# Patient Record
Sex: FEMALE | Race: OTHER | HISPANIC OR LATINO
[De-identification: names, ages, dates, MRNs, and addresses within clinical notes are randomized per-mention and may not be internally consistent; named-entity substitution may affect disease eponyms.]

---

## 2017-03-13 ENCOUNTER — APPOINTMENT (OUTPATIENT)
Dept: OBGYN | Facility: CLINIC | Age: 72
End: 2017-03-13

## 2018-04-23 ENCOUNTER — LABORATORY RESULT (OUTPATIENT)
Age: 73
End: 2018-04-23

## 2018-04-24 ENCOUNTER — APPOINTMENT (OUTPATIENT)
Dept: OBGYN | Facility: CLINIC | Age: 73
End: 2018-04-24
Payer: MEDICARE

## 2018-04-24 VITALS
HEIGHT: 59 IN | DIASTOLIC BLOOD PRESSURE: 80 MMHG | BODY MASS INDEX: 36.29 KG/M2 | SYSTOLIC BLOOD PRESSURE: 128 MMHG | WEIGHT: 180 LBS

## 2018-04-24 PROCEDURE — 99397 PER PM REEVAL EST PAT 65+ YR: CPT

## 2018-04-24 RX ORDER — DULOXETINE HYDROCHLORIDE 20 MG/1
20 CAPSULE, DELAYED RELEASE ORAL
Refills: 0 | Status: ACTIVE | COMMUNITY

## 2019-06-18 ENCOUNTER — EMERGENCY (EMERGENCY)
Facility: HOSPITAL | Age: 74
LOS: 1 days | Discharge: HOME | End: 2019-06-18
Attending: EMERGENCY MEDICINE
Payer: MEDICARE

## 2019-06-18 VITALS
WEIGHT: 179.9 LBS | TEMPERATURE: 98 F | RESPIRATION RATE: 17 BRPM | DIASTOLIC BLOOD PRESSURE: 86 MMHG | HEIGHT: 64 IN | OXYGEN SATURATION: 96 % | HEART RATE: 77 BPM | SYSTOLIC BLOOD PRESSURE: 141 MMHG

## 2019-06-18 VITALS
RESPIRATION RATE: 18 BRPM | SYSTOLIC BLOOD PRESSURE: 133 MMHG | DIASTOLIC BLOOD PRESSURE: 86 MMHG | TEMPERATURE: 98 F | OXYGEN SATURATION: 96 % | HEART RATE: 76 BPM

## 2019-06-18 DIAGNOSIS — Z98.89 OTHER SPECIFIED POSTPROCEDURAL STATES: Chronic | ICD-10-CM

## 2019-06-18 DIAGNOSIS — L73.2 HIDRADENITIS SUPPURATIVA: Chronic | ICD-10-CM

## 2019-06-18 DIAGNOSIS — Z96.60 PRESENCE OF UNSPECIFIED ORTHOPEDIC JOINT IMPLANT: Chronic | ICD-10-CM

## 2019-06-18 PROCEDURE — 73562 X-RAY EXAM OF KNEE 3: CPT

## 2019-06-18 PROCEDURE — 99284 EMERGENCY DEPT VISIT MOD MDM: CPT

## 2019-06-18 PROCEDURE — 73700 CT LOWER EXTREMITY W/O DYE: CPT

## 2019-06-18 PROCEDURE — 99284 EMERGENCY DEPT VISIT MOD MDM: CPT | Mod: 25

## 2019-06-18 PROCEDURE — 73562 X-RAY EXAM OF KNEE 3: CPT | Mod: 26,RT

## 2019-06-18 RX ORDER — OXYCODONE AND ACETAMINOPHEN 5; 325 MG/1; MG/1
1 TABLET ORAL ONCE
Refills: 0 | Status: DISCONTINUED | OUTPATIENT
Start: 2019-06-18 | End: 2019-06-18

## 2019-06-18 RX ADMIN — OXYCODONE AND ACETAMINOPHEN 1 TABLET(S): 5; 325 TABLET ORAL at 21:06

## 2019-06-18 RX ADMIN — OXYCODONE AND ACETAMINOPHEN 1 TABLET(S): 5; 325 TABLET ORAL at 20:31

## 2019-06-18 NOTE — ED ADULT NURSE NOTE - NSIMPLEMENTINTERV_GEN_ALL_ED
Implemented All Universal Safety Interventions:  [place] to call system. Call bell, personal items and telephone within reach. Instruct patient to call for assistance. Room bathroom lighting operational. Non-slip footwear when patient is off stretcher. Physically safe environment: no spills, clutter or unnecessary equipment. Stretcher in lowest position, wheels locked, appropriate side rails in place.

## 2019-06-18 NOTE — ED ADULT NURSE NOTE - OBJECTIVE STATEMENT
presented with c/o RT knee/ ankle pain S/P [facility] fall today also reports lower back pain , [id number] head trauma LOC

## 2019-06-18 NOTE — ED PROVIDER NOTE - PSH
Hidradenitis suppurativa  right axilla and groin  S/P arthroscopy of left knee    S/P breast biopsy, right    S/P carpal tunnel release  right  S/P hip replacement  right  S/P spinal surgery  lumbar

## 2019-06-18 NOTE — ED ADULT NURSE NOTE - CAS ELECT INFOMATION PROVIDED
[facility]  Lauren  [phone]   Sedrick passed away last night   2/14   DC instructions

## 2019-06-18 NOTE — ED ADULT TRIAGE NOTE - CHIEF COMPLAINT QUOTE
R knee pain s/p trip and fall in the basement ,denied hitting head/loc, h/o charmaine hip replacement

## 2019-06-18 NOTE — ED PROVIDER NOTE - PROGRESS NOTE DETAILS
Pt adamant to leave. Understands no results back. Limit eval showing patellar fracture with comminution. Pt refusing to stay for official results. Placed in brace, has been ambulating with crutches. r/b/a explained.

## 2019-06-19 PROCEDURE — 73700 CT LOWER EXTREMITY W/O DYE: CPT | Mod: 26,RT

## 2020-01-15 ENCOUNTER — LABORATORY RESULT (OUTPATIENT)
Age: 75
End: 2020-01-15

## 2020-01-16 ENCOUNTER — APPOINTMENT (OUTPATIENT)
Dept: OBGYN | Facility: CLINIC | Age: 75
End: 2020-01-16
Payer: MEDICARE

## 2020-01-16 VITALS — WEIGHT: 175 LBS | SYSTOLIC BLOOD PRESSURE: 126 MMHG | DIASTOLIC BLOOD PRESSURE: 78 MMHG | BODY MASS INDEX: 35.35 KG/M2

## 2020-01-16 DIAGNOSIS — Z00.00 ENCOUNTER FOR GENERAL ADULT MEDICAL EXAMINATION W/OUT ABNORMAL FINDINGS: ICD-10-CM

## 2020-01-16 PROCEDURE — 99397 PER PM REEVAL EST PAT 65+ YR: CPT

## 2020-01-16 NOTE — COUNSELING
[Nutrition] : nutrition [Exercise] : exercise [Breast Self Exam] : breast self exam [STD (testing, results, tx)] : STD (testing, results, tx) [Vitamins/Supplements] : vitamins/supplements

## 2020-01-16 NOTE — PHYSICAL EXAM
[Awake] : awake [Alert] : alert [Soft] : soft [Oriented x3] : oriented to person, place, and time [No Bleeding] : there was no active vaginal bleeding [Uterine Adnexae] : were not tender and not enlarged [Normal] : cervix [CTAB] : CTAB [RRR, No Murmurs] : RRR, no murmurs [LAD] : no lymphadenopathy [Acute Distress] : no acute distress [Mass] : no breast mass [Goiter] : no goiter [Thyroid Nodule] : no thyroid nodule [Axillary LAD] : no axillary lymphadenopathy [Nipple Discharge] : no nipple discharge [Tender] : non tender [Distended] : not distended [H/Smegaly] : no hepatosplenomegaly [Depressed Mood] : not depressed [Flat Affect] : affect not flat

## 2020-02-04 ENCOUNTER — APPOINTMENT (OUTPATIENT)
Dept: GASTROENTEROLOGY | Facility: CLINIC | Age: 75
End: 2020-02-04

## 2020-02-19 ENCOUNTER — APPOINTMENT (OUTPATIENT)
Dept: GASTROENTEROLOGY | Facility: CLINIC | Age: 75
End: 2020-02-19
Payer: MEDICARE

## 2020-02-19 VITALS
BODY MASS INDEX: 34.47 KG/M2 | WEIGHT: 171 LBS | DIASTOLIC BLOOD PRESSURE: 75 MMHG | OXYGEN SATURATION: 98 % | TEMPERATURE: 97.7 F | SYSTOLIC BLOOD PRESSURE: 110 MMHG | HEART RATE: 65 BPM | HEIGHT: 59 IN

## 2020-02-19 DIAGNOSIS — R07.89 OTHER CHEST PAIN: ICD-10-CM

## 2020-02-19 DIAGNOSIS — K62.5 HEMORRHAGE OF ANUS AND RECTUM: ICD-10-CM

## 2020-02-19 DIAGNOSIS — K21.9 GASTRO-ESOPHAGEAL REFLUX DISEASE W/OUT ESOPHAGITIS: ICD-10-CM

## 2020-02-19 DIAGNOSIS — Z87.19 PERSONAL HISTORY OF OTHER DISEASES OF THE DIGESTIVE SYSTEM: ICD-10-CM

## 2020-02-19 DIAGNOSIS — R10.13 EPIGASTRIC PAIN: ICD-10-CM

## 2020-02-19 DIAGNOSIS — Z86.010 PERSONAL HISTORY OF COLONIC POLYPS: ICD-10-CM

## 2020-02-19 PROCEDURE — 99203 OFFICE O/P NEW LOW 30 MIN: CPT

## 2020-02-19 RX ORDER — POLYETHYLENE GLYCOL 3350, SODIUM CHLORIDE, SODIUM BICARBONATE AND POTASSIUM CHLORIDE WITH LEMON FLAVOR 420; 11.2; 5.72; 1.48 G/4L; G/4L; G/4L; G/4L
420 POWDER, FOR SOLUTION ORAL
Qty: 1 | Refills: 0 | Status: ACTIVE | COMMUNITY
Start: 2020-02-19 | End: 1900-01-01

## 2020-02-19 NOTE — HISTORY OF PRESENT ILLNESS
[None] : had no significant interval events [Nausea] : denies nausea [Vomiting] : denies vomiting [Diarrhea] : denies diarrhea [Constipation] : denies constipation [Yellow Skin Or Eyes (Jaundice)] : denies jaundice [Abdominal Pain] : denies abdominal pain [Rectal Pain] : denies rectal pain [Heartburn] : heartburn [Abdominal Swelling] : abdominal swelling [GERD] : gastroesophageal reflux disease [Peptic Ulcer Disease] : peptic ulcer disease [Hiatus Hernia] : hiatus hernia [Diverticulitis] : diverticulitis [Wt Gain ___ Lbs] : no recent weight gain [Wt Loss ___ Lbs] : no recent weight loss [Cholelithiasis] : no cholelithiasis [Pancreatitis] : no pancreatitis [Kidney Stone] : no kidney stone [Irritable Bowel Syndrome] : no irritable bowel syndrome [Inflammatory Bowel Disease] : no inflammatory bowel disease [Abdominal Surgery] : no abdominal surgery [Malignancy] : no malignancy [Alcohol Abuse] : no alcohol abuse [de-identified] : The CT colonography performed on October 29, 2015 revealed colonic diverticulosis with short segment low-attenuation wall thickening of the mid sigmoid colon centered around an inflamed diverticulum with mild adjacent stranding compatible with acute diverticulitis.  An underlying colonic diverticular stricture or lesion is not entirely excluded.  Otherwise no evidence of mucosal polypoid lesion or annular mass.  Also noted were bilateral adrenal adenomas. [Appendectomy] : no appendectomy [Cholecystectomy] : no cholecystectomy [de-identified] : The patient is a 74-year-old  female with no significant past medical history who was referred to my office by Dr. Burroughs for lower GI bleeding. The patient also admits to having dyspepsia, gastroesophageal reflux disease and atypical chest pain. I was asked to render an opinion for consultation for the above complaints.   The patient states that she is feeling uncomfortable x several years.  The patient denies any abdominal pain.  The patient complains of abdominal gas and bloating.  The patient denies any nausea or vomiting.  The patient complains of gastroesophageal reflux disease but denies any dysphagia. The gastroesophageal reflux disease is worse after meals and late at night and in the early morning. The gastroesophageal reflux disease is improved with proton pump inhibitors, H2 blockers and antacids.  The patient complains of atypical chest pain and palpitations but denies any shortness of breath.   The patient was previously evaluated by a cardiologist.  According to the patient, the cardiac evaluation was stable.  The chest pain is described as a pressure, intermittent substernal discomfort that is nonradiating in nature.  The patient denies any diaphoresis.  The chest pain is described as being 2 to 3 out of 10 in intensity.  The chest pain can occur at any time.  The chest pain is worse at night and early morning.  The chest pain is unrelated to meals and passing gas.  The chest pain never awakened the patient from sleep.  The patient denies any constipation or diarrhea.  The patient has 1 bowel movement a day.  The patient denies a change in bowel habits.  The patient denies a change in caliber of stool.  The patient denies having mucus discharge with the bowel movements.  The patient complains of lower GI bleeding but denies any melena or hematemesis.  The lower GI bleeding is associated with internal hemorrhoids.  The patient denies any rectal pain or rectal pruritus. The patient denies any weight loss or anorexia.  She denies any fevers or chills.  The patient denies any jaundice or pruritus.  The patient complains of chronic lower back pain.  The patient admits to having a prior upper endoscopy and colonoscopy performed by another gastroenterologist, Dr. Car Tam.  According to the patient, the upper endoscopic findings revealed a hiatal hernia, reflux esophagitis.  The colonoscopic findings revealed an attempted colonoscopy diverticulosis, colonic polyp and internal hemorrhoids.   The CT colonography performed on 2015 revealed colonic diverticulosis with short segment low-attenuation wall thickening of the mid sigmoid colon centered around an inflamed diverticulum with mild adjacent stranding compatible with acute diverticulitis.  An underlying colonic diverticular stricture or lesion is not entirely excluded.  Otherwise no evidence of mucosal polypoid lesion or annular mass.  Also noted were bilateral adrenal adenomas.  The patient has a history of duodenal ulcer.  The patient's last menstrual period was age 52.  The patient is a .  The patient's first menstrual period was at age 12. The patient admits to a family history of GI problems.  The patient’s mother had a history of diverticular disease.

## 2020-02-19 NOTE — ASSESSMENT
[FreeTextEntry1] : Dyspepsia: The patient complains of dyspeptic symptoms.  The patient was advised to abide by an anti-gas diet.  The patient was given a pamphlet for anti-gas.  The patient and I reviewed the anti-gas diet at length. The patient is to start on a trial of Phazyme one tablet 3 times a day p.r.n. abdominal pain and gas.\par GERD: The patient was advised to avoid late-night meals and dietary indiscretions.  The patient was advised to avoid fried and fatty foods.  The patient was advised to abide by an anti-GERD diet. The patient was given a pamphlet for anti-GERD.  The patient and I reviewed the anti-GERD diet at length. I recommend continue on a trial of Omeprazole 40 mg once a day x 3 months for the symptoms.\par Atypical Chest Pain: The patient complains of atypical chest pain of unclear etiology.  The patient was advised to follow up with the PMD and cardiologist regarding evaluation for the atypical chest pain. The patient was told of possible etiologies such as cardiac, pulmonary, GI, musculoskeletal, stress and other causes for the atypical chest pain.  The patient agrees and will follow-up with the PMD and cardiologist. \par If the symptoms persist, the patient may require an upper endoscopy to assess for peptic ulcer disease versus esophagitis.  The patient was told of the risks and benefits of the procedure.  The patient was told of the risks of perforation, emergency surgery, bleeding, infections and missed lesions.  The patient agreed and will follow-up to reassess the symptoms.\par Prior Endoscopic Procedures: The patient had a prior upper endoscopy and colonoscopy performed by another gastroenterologist, Dr. Car Tam.  I will try to obtain the prior upper endoscopy and colonoscopy reports.  The patient is to sign a record release to obtain those records.\par Rectal Bleeding: The patient had episodes of rectal bleeding.  The etiology of the rectal bleeding is unclear.  I recommend a trial of Anusol HC suppositories one per rectum QHS and Anusol HC 2.5% cream apply to affected area twice a day PRN hemorrhoidal bleeding or pain.  I recommend a trial of Calmoseptine cream apply to affected area twice a day for rectal discomfort. I recommend Tucks pads for the hemorrhoids.  I recommend starting Sitz baths twice a day for the hemorrhoids.  I recommend avoid wearing tight underwear and use boxers. I recommend avoid touching the perineal area. I recommend a colonoscopy to assess the site of bleeding. The patient was told of the risks and benefits of the procedure.  The patient was told of the risks of perforation, emergency surgery, bleeding, infections and missed lesions.  The patient agreed and will schedule for the procedure. The patient is to be n.p.o. after midnight and bowel prep was given.  The patient is to return for the procedure.\par History of colonic polyps: The patient was found to have colonic polyps on prior colonoscopy.  I recommend a repeat colonoscopy to reassess for colonic polyp surveillance. The patient was told of the risks and benefits of the procedure.  The patient was told of the risks of bleeding, infection, perforation, emergency surgery and missed lesions.  There was also a discussion of alternative tests.  The patient agreed and will schedule for the procedure.  The patient is to be n.p.o. after midnight and bowel prep was given.  The patient is to return for the procedure.\par History of Diverticulitis: the patient has a history of diverticulitis.  The patient is currently asymptomatic.  The CT colonography performed on October 29, 2015 revealed colonic diverticulosis with short segment low-attenuation wall thickening of the mid sigmoid colon centered around an inflamed diverticulum with mild adjacent stranding compatible with acute diverticulitis.  An underlying colonic diverticular stricture or lesion is not entirely excluded.  Otherwise no evidence of mucosal polypoid lesion or annular mass.  Also noted were bilateral adrenal adenomas.  I recommend a low residue diet.  If the symptoms recur, the patient may require a CAT scan of the abdomen and pelvis with IV contrast to assess the acute diverticulitis and possible complications. The patient was advised to go to the hospital if fever, chills, worsening abdominal pain or GI bleeding recurs.  The patient agrees.\par Follow-up: The patient is to follow-up in the office in 4 weeks to reassess the symptoms. The patient was told to call the office if any further problems. \par \par \par \par \par

## 2020-02-19 NOTE — REVIEW OF SYSTEMS
[Eyesight Problems] : eyesight problems [Chest Pain] : chest pain [Heartburn] : heartburn [Incontinence] : incontinence [Depression] : depression [Negative] : Heme/Lymph [FreeTextEntry8] : nocturia, polyuria

## 2020-02-20 LAB — HEMOCCULT STL QL: NEGATIVE

## 2020-03-03 ENCOUNTER — APPOINTMENT (OUTPATIENT)
Dept: GASTROENTEROLOGY | Facility: HOSPITAL | Age: 75
End: 2020-03-03

## 2020-03-03 ENCOUNTER — OUTPATIENT (OUTPATIENT)
Dept: OUTPATIENT SERVICES | Facility: HOSPITAL | Age: 75
LOS: 1 days | End: 2020-03-03
Payer: MEDICARE

## 2020-03-03 ENCOUNTER — RESULT REVIEW (OUTPATIENT)
Age: 75
End: 2020-03-03

## 2020-03-03 DIAGNOSIS — Z98.89 OTHER SPECIFIED POSTPROCEDURAL STATES: Chronic | ICD-10-CM

## 2020-03-03 DIAGNOSIS — Z96.60 PRESENCE OF UNSPECIFIED ORTHOPEDIC JOINT IMPLANT: Chronic | ICD-10-CM

## 2020-03-03 DIAGNOSIS — L73.2 HIDRADENITIS SUPPURATIVA: Chronic | ICD-10-CM

## 2020-03-03 DIAGNOSIS — K21.9 GASTRO-ESOPHAGEAL REFLUX DISEASE WITHOUT ESOPHAGITIS: ICD-10-CM

## 2020-03-03 PROCEDURE — 43239 EGD BIOPSY SINGLE/MULTIPLE: CPT

## 2020-03-03 PROCEDURE — 88305 TISSUE EXAM BY PATHOLOGIST: CPT

## 2020-03-03 PROCEDURE — 88312 SPECIAL STAINS GROUP 1: CPT | Mod: 26

## 2020-03-03 PROCEDURE — 88305 TISSUE EXAM BY PATHOLOGIST: CPT | Mod: 26

## 2020-03-03 PROCEDURE — 45378 DIAGNOSTIC COLONOSCOPY: CPT

## 2020-03-03 PROCEDURE — 88312 SPECIAL STAINS GROUP 1: CPT

## 2020-03-03 PROCEDURE — 45378 DIAGNOSTIC COLONOSCOPY: CPT | Mod: 53

## 2020-03-04 DIAGNOSIS — K62.3 RECTAL PROLAPSE: ICD-10-CM

## 2020-03-04 RX ORDER — POLYETHYLENE GLYCOL 3350, SODIUM CHLORIDE, SODIUM BICARBONATE AND POTASSIUM CHLORIDE WITH LEMON FLAVOR 420; 11.2; 5.72; 1.48 G/4L; G/4L; G/4L; G/4L
420 POWDER, FOR SOLUTION ORAL
Qty: 1 | Refills: 0 | Status: ACTIVE | COMMUNITY
Start: 2020-03-04 | End: 1900-01-01

## 2020-03-05 LAB — SURGICAL PATHOLOGY STUDY: SIGNIFICANT CHANGE UP

## 2020-03-13 RX ORDER — POLYETHYLENE GLYCOL 3350, SODIUM CHLORIDE, SODIUM BICARBONATE AND POTASSIUM CHLORIDE WITH LEMON FLAVOR 420; 11.2; 5.72; 1.48 G/4L; G/4L; G/4L; G/4L
420 POWDER, FOR SOLUTION ORAL
Qty: 1 | Refills: 0 | Status: ACTIVE | COMMUNITY
Start: 2020-03-13 | End: 1900-01-01

## 2020-07-10 NOTE — ED PROVIDER NOTE - NS ED MD EM SELECTION
42354 Detailed Xeljanz Counseling: I discussed with the patient the risks of Xeljanz therapy including increased risk of infection, liver issues, headache, diarrhea, or cold symptoms. Live vaccines should be avoided. They were instructed to call if they have any problems.

## 2020-12-10 ENCOUNTER — RX RENEWAL (OUTPATIENT)
Age: 75
End: 2020-12-10

## 2020-12-10 RX ORDER — OMEPRAZOLE 40 MG/1
40 CAPSULE, DELAYED RELEASE ORAL
Qty: 90 | Refills: 3 | Status: ACTIVE | COMMUNITY
Start: 2020-02-25 | End: 1900-01-01

## 2022-09-28 NOTE — ED ADULT NURSE NOTE - NSFALLRSKINDICATORS_ED_ALL_ED
Patient received flu vaccine in left deltoid. Patient educated on types of reactions to report. Patient sat in office for 15 minutes after receiving vaccine, and tolerated well.     
no

## 2022-10-03 ENCOUNTER — INPATIENT (INPATIENT)
Facility: HOSPITAL | Age: 77
LOS: 3 days | Discharge: HOME | DRG: 419 | End: 2022-10-07
Attending: SURGERY | Admitting: SURGERY
Payer: MEDICARE

## 2022-10-03 VITALS
DIASTOLIC BLOOD PRESSURE: 89 MMHG | WEIGHT: 177.91 LBS | SYSTOLIC BLOOD PRESSURE: 133 MMHG | HEIGHT: 64 IN | HEART RATE: 101 BPM | RESPIRATION RATE: 18 BRPM | TEMPERATURE: 98 F | OXYGEN SATURATION: 98 %

## 2022-10-03 DIAGNOSIS — Z98.89 OTHER SPECIFIED POSTPROCEDURAL STATES: Chronic | ICD-10-CM

## 2022-10-03 DIAGNOSIS — L73.2 HIDRADENITIS SUPPURATIVA: Chronic | ICD-10-CM

## 2022-10-03 DIAGNOSIS — Z96.60 PRESENCE OF UNSPECIFIED ORTHOPEDIC JOINT IMPLANT: Chronic | ICD-10-CM

## 2022-10-03 LAB
ALBUMIN SERPL ELPH-MCNC: 3.5 G/DL — SIGNIFICANT CHANGE UP (ref 3.5–5)
ALP SERPL-CCNC: 324 U/L — HIGH (ref 40–120)
ALT FLD-CCNC: 553 U/L DA — HIGH (ref 10–60)
ANION GAP SERPL CALC-SCNC: 9 MMOL/L — SIGNIFICANT CHANGE UP (ref 5–17)
APTT BLD: 33 SEC — SIGNIFICANT CHANGE UP (ref 27.5–35.5)
AST SERPL-CCNC: 428 U/L — HIGH (ref 10–40)
BASOPHILS # BLD AUTO: 0.06 K/UL — SIGNIFICANT CHANGE UP (ref 0–0.2)
BASOPHILS NFR BLD AUTO: 0.5 % — SIGNIFICANT CHANGE UP (ref 0–2)
BILIRUB SERPL-MCNC: 6.6 MG/DL — HIGH (ref 0.2–1.2)
BLD GP AB SCN SERPL QL: SIGNIFICANT CHANGE UP
BUN SERPL-MCNC: 7 MG/DL — SIGNIFICANT CHANGE UP (ref 7–18)
CALCIUM SERPL-MCNC: 9.5 MG/DL — SIGNIFICANT CHANGE UP (ref 8.4–10.5)
CHLORIDE SERPL-SCNC: 106 MMOL/L — SIGNIFICANT CHANGE UP (ref 96–108)
CO2 SERPL-SCNC: 23 MMOL/L — SIGNIFICANT CHANGE UP (ref 22–31)
CREAT SERPL-MCNC: 0.82 MG/DL — SIGNIFICANT CHANGE UP (ref 0.5–1.3)
EGFR: 75 ML/MIN/1.73M2 — SIGNIFICANT CHANGE UP
EOSINOPHIL # BLD AUTO: 0.21 K/UL — SIGNIFICANT CHANGE UP (ref 0–0.5)
EOSINOPHIL NFR BLD AUTO: 1.6 % — SIGNIFICANT CHANGE UP (ref 0–6)
GLUCOSE SERPL-MCNC: 112 MG/DL — HIGH (ref 70–99)
HCT VFR BLD CALC: 48.4 % — HIGH (ref 34.5–45)
HGB BLD-MCNC: 15.6 G/DL — HIGH (ref 11.5–15.5)
IMM GRANULOCYTES NFR BLD AUTO: 0.3 % — SIGNIFICANT CHANGE UP (ref 0–0.9)
INR BLD: 1.03 RATIO — SIGNIFICANT CHANGE UP (ref 0.88–1.16)
LIDOCAIN IGE QN: 92 U/L — SIGNIFICANT CHANGE UP (ref 73–393)
LYMPHOCYTES # BLD AUTO: 0.85 K/UL — LOW (ref 1–3.3)
LYMPHOCYTES # BLD AUTO: 6.6 % — LOW (ref 13–44)
MCHC RBC-ENTMCNC: 30.5 PG — SIGNIFICANT CHANGE UP (ref 27–34)
MCHC RBC-ENTMCNC: 32.2 GM/DL — SIGNIFICANT CHANGE UP (ref 32–36)
MCV RBC AUTO: 94.5 FL — SIGNIFICANT CHANGE UP (ref 80–100)
MONOCYTES # BLD AUTO: 0.84 K/UL — SIGNIFICANT CHANGE UP (ref 0–0.9)
MONOCYTES NFR BLD AUTO: 6.5 % — SIGNIFICANT CHANGE UP (ref 2–14)
NEUTROPHILS # BLD AUTO: 10.87 K/UL — HIGH (ref 1.8–7.4)
NEUTROPHILS NFR BLD AUTO: 84.5 % — HIGH (ref 43–77)
NRBC # BLD: 0 /100 WBCS — SIGNIFICANT CHANGE UP (ref 0–0)
PLATELET # BLD AUTO: 267 K/UL — SIGNIFICANT CHANGE UP (ref 150–400)
POTASSIUM SERPL-MCNC: 4.3 MMOL/L — SIGNIFICANT CHANGE UP (ref 3.5–5.3)
POTASSIUM SERPL-SCNC: 4.3 MMOL/L — SIGNIFICANT CHANGE UP (ref 3.5–5.3)
PROT SERPL-MCNC: 8.5 G/DL — HIGH (ref 6–8.3)
PROTHROM AB SERPL-ACNC: 12.3 SEC — SIGNIFICANT CHANGE UP (ref 10.5–13.4)
RBC # BLD: 5.12 M/UL — SIGNIFICANT CHANGE UP (ref 3.8–5.2)
RBC # FLD: 14.1 % — SIGNIFICANT CHANGE UP (ref 10.3–14.5)
SARS-COV-2 RNA SPEC QL NAA+PROBE: SIGNIFICANT CHANGE UP
SODIUM SERPL-SCNC: 138 MMOL/L — SIGNIFICANT CHANGE UP (ref 135–145)
TROPONIN I, HIGH SENSITIVITY RESULT: 4.4 NG/L — SIGNIFICANT CHANGE UP
WBC # BLD: 12.87 K/UL — HIGH (ref 3.8–10.5)
WBC # FLD AUTO: 12.87 K/UL — HIGH (ref 3.8–10.5)

## 2022-10-03 PROCEDURE — 99285 EMERGENCY DEPT VISIT HI MDM: CPT

## 2022-10-03 RX ORDER — ACETAMINOPHEN 500 MG
650 TABLET ORAL ONCE
Refills: 0 | Status: COMPLETED | OUTPATIENT
Start: 2022-10-03 | End: 2022-10-03

## 2022-10-03 RX ORDER — ONDANSETRON 8 MG/1
4 TABLET, FILM COATED ORAL ONCE
Refills: 0 | Status: COMPLETED | OUTPATIENT
Start: 2022-10-03 | End: 2022-10-03

## 2022-10-03 RX ORDER — MORPHINE SULFATE 50 MG/1
4 CAPSULE, EXTENDED RELEASE ORAL ONCE
Refills: 0 | Status: DISCONTINUED | OUTPATIENT
Start: 2022-10-03 | End: 2022-10-03

## 2022-10-03 RX ORDER — PIPERACILLIN AND TAZOBACTAM 4; .5 G/20ML; G/20ML
3.38 INJECTION, POWDER, LYOPHILIZED, FOR SOLUTION INTRAVENOUS ONCE
Refills: 0 | Status: COMPLETED | OUTPATIENT
Start: 2022-10-03 | End: 2022-10-03

## 2022-10-03 RX ORDER — SODIUM CHLORIDE 9 MG/ML
1000 INJECTION INTRAMUSCULAR; INTRAVENOUS; SUBCUTANEOUS ONCE
Refills: 0 | Status: COMPLETED | OUTPATIENT
Start: 2022-10-03 | End: 2022-10-03

## 2022-10-03 RX ADMIN — ONDANSETRON 4 MILLIGRAM(S): 8 TABLET, FILM COATED ORAL at 20:32

## 2022-10-03 RX ADMIN — MORPHINE SULFATE 4 MILLIGRAM(S): 50 CAPSULE, EXTENDED RELEASE ORAL at 20:32

## 2022-10-03 RX ADMIN — SODIUM CHLORIDE 1000 MILLILITER(S): 9 INJECTION INTRAMUSCULAR; INTRAVENOUS; SUBCUTANEOUS at 20:31

## 2022-10-03 RX ADMIN — MORPHINE SULFATE 4 MILLIGRAM(S): 50 CAPSULE, EXTENDED RELEASE ORAL at 21:10

## 2022-10-03 NOTE — ED ADULT NURSE NOTE - BOWEL SOUNDS LLQ

## 2022-10-03 NOTE — ED ADULT NURSE NOTE - OBJECTIVE STATEMENT
Pt presents to the Ed with c/o abdominal pain with nausea and vomiting that started 3 days ago. Pt denies any other medical complaint at this time.

## 2022-10-04 DIAGNOSIS — K81.0 ACUTE CHOLECYSTITIS: ICD-10-CM

## 2022-10-04 LAB
ALBUMIN SERPL ELPH-MCNC: 3.2 G/DL — LOW (ref 3.5–5)
ALP SERPL-CCNC: 271 U/L — HIGH (ref 40–120)
ALT FLD-CCNC: 420 U/L DA — HIGH (ref 10–60)
ANION GAP SERPL CALC-SCNC: 11 MMOL/L — SIGNIFICANT CHANGE UP (ref 5–17)
APPEARANCE UR: CLEAR — SIGNIFICANT CHANGE UP
AST SERPL-CCNC: 268 U/L — HIGH (ref 10–40)
BACTERIA # UR AUTO: ABNORMAL /HPF
BASOPHILS # BLD AUTO: 0.06 K/UL — SIGNIFICANT CHANGE UP (ref 0–0.2)
BASOPHILS NFR BLD AUTO: 0.8 % — SIGNIFICANT CHANGE UP (ref 0–2)
BILIRUB SERPL-MCNC: 5.3 MG/DL — HIGH (ref 0.2–1.2)
BILIRUB UR-MCNC: ABNORMAL
BUN SERPL-MCNC: 6 MG/DL — LOW (ref 7–18)
CALCIUM SERPL-MCNC: 9.5 MG/DL — SIGNIFICANT CHANGE UP (ref 8.4–10.5)
CHLORIDE SERPL-SCNC: 107 MMOL/L — SIGNIFICANT CHANGE UP (ref 96–108)
CO2 SERPL-SCNC: 22 MMOL/L — SIGNIFICANT CHANGE UP (ref 22–31)
COLOR SPEC: YELLOW — SIGNIFICANT CHANGE UP
CREAT SERPL-MCNC: 0.77 MG/DL — SIGNIFICANT CHANGE UP (ref 0.5–1.3)
DIFF PNL FLD: ABNORMAL
EGFR: 80 ML/MIN/1.73M2 — SIGNIFICANT CHANGE UP
EOSINOPHIL # BLD AUTO: 0.54 K/UL — HIGH (ref 0–0.5)
EOSINOPHIL NFR BLD AUTO: 7.2 % — HIGH (ref 0–6)
EPI CELLS # UR: SIGNIFICANT CHANGE UP /HPF
GLUCOSE SERPL-MCNC: 118 MG/DL — HIGH (ref 70–99)
GLUCOSE UR QL: NEGATIVE — SIGNIFICANT CHANGE UP
HCT VFR BLD CALC: 42.8 % — SIGNIFICANT CHANGE UP (ref 34.5–45)
HGB BLD-MCNC: 13.7 G/DL — SIGNIFICANT CHANGE UP (ref 11.5–15.5)
HYALINE CASTS # UR AUTO: ABNORMAL /LPF
IMM GRANULOCYTES NFR BLD AUTO: 0.3 % — SIGNIFICANT CHANGE UP (ref 0–0.9)
KETONES UR-MCNC: NEGATIVE — SIGNIFICANT CHANGE UP
LACTATE SERPL-SCNC: 1 MMOL/L — SIGNIFICANT CHANGE UP (ref 0.7–2)
LEUKOCYTE ESTERASE UR-ACNC: ABNORMAL
LYMPHOCYTES # BLD AUTO: 1 K/UL — SIGNIFICANT CHANGE UP (ref 1–3.3)
LYMPHOCYTES # BLD AUTO: 13.4 % — SIGNIFICANT CHANGE UP (ref 13–44)
MAGNESIUM SERPL-MCNC: 2.2 MG/DL — SIGNIFICANT CHANGE UP (ref 1.6–2.6)
MCHC RBC-ENTMCNC: 30.4 PG — SIGNIFICANT CHANGE UP (ref 27–34)
MCHC RBC-ENTMCNC: 32 GM/DL — SIGNIFICANT CHANGE UP (ref 32–36)
MCV RBC AUTO: 94.9 FL — SIGNIFICANT CHANGE UP (ref 80–100)
MONOCYTES # BLD AUTO: 0.55 K/UL — SIGNIFICANT CHANGE UP (ref 0–0.9)
MONOCYTES NFR BLD AUTO: 7.4 % — SIGNIFICANT CHANGE UP (ref 2–14)
NEUTROPHILS # BLD AUTO: 5.3 K/UL — SIGNIFICANT CHANGE UP (ref 1.8–7.4)
NEUTROPHILS NFR BLD AUTO: 70.9 % — SIGNIFICANT CHANGE UP (ref 43–77)
NITRITE UR-MCNC: NEGATIVE — SIGNIFICANT CHANGE UP
NRBC # BLD: 0 /100 WBCS — SIGNIFICANT CHANGE UP (ref 0–0)
PH UR: 6 — SIGNIFICANT CHANGE UP (ref 5–8)
PHOSPHATE SERPL-MCNC: 2.5 MG/DL — SIGNIFICANT CHANGE UP (ref 2.5–4.5)
PLATELET # BLD AUTO: 222 K/UL — SIGNIFICANT CHANGE UP (ref 150–400)
POTASSIUM SERPL-MCNC: 3.5 MMOL/L — SIGNIFICANT CHANGE UP (ref 3.5–5.3)
POTASSIUM SERPL-SCNC: 3.5 MMOL/L — SIGNIFICANT CHANGE UP (ref 3.5–5.3)
PROT SERPL-MCNC: 7 G/DL — SIGNIFICANT CHANGE UP (ref 6–8.3)
PROT UR-MCNC: 30 MG/DL
RBC # BLD: 4.51 M/UL — SIGNIFICANT CHANGE UP (ref 3.8–5.2)
RBC # FLD: 14.4 % — SIGNIFICANT CHANGE UP (ref 10.3–14.5)
RBC CASTS # UR COMP ASSIST: SIGNIFICANT CHANGE UP /HPF (ref 0–2)
SODIUM SERPL-SCNC: 140 MMOL/L — SIGNIFICANT CHANGE UP (ref 135–145)
SP GR SPEC: 1.01 — SIGNIFICANT CHANGE UP (ref 1.01–1.02)
UROBILINOGEN FLD QL: 8
WBC # BLD: 7.47 K/UL — SIGNIFICANT CHANGE UP (ref 3.8–10.5)
WBC # FLD AUTO: 7.47 K/UL — SIGNIFICANT CHANGE UP (ref 3.8–10.5)
WBC UR QL: SIGNIFICANT CHANGE UP /HPF (ref 0–5)

## 2022-10-04 PROCEDURE — 76705 ECHO EXAM OF ABDOMEN: CPT | Mod: 26

## 2022-10-04 PROCEDURE — 74177 CT ABD & PELVIS W/CONTRAST: CPT | Mod: 26,MA

## 2022-10-04 PROCEDURE — 99222 1ST HOSP IP/OBS MODERATE 55: CPT | Mod: 57

## 2022-10-04 RX ORDER — VANCOMYCIN HCL 1 G
1000 VIAL (EA) INTRAVENOUS ONCE
Refills: 0 | Status: COMPLETED | OUTPATIENT
Start: 2022-10-04 | End: 2022-10-04

## 2022-10-04 RX ORDER — SODIUM CHLORIDE 9 MG/ML
1000 INJECTION, SOLUTION INTRAVENOUS
Refills: 0 | Status: DISCONTINUED | OUTPATIENT
Start: 2022-10-04 | End: 2022-10-07

## 2022-10-04 RX ORDER — HYDROMORPHONE HYDROCHLORIDE 2 MG/ML
0.5 INJECTION INTRAMUSCULAR; INTRAVENOUS; SUBCUTANEOUS EVERY 4 HOURS
Refills: 0 | Status: DISCONTINUED | OUTPATIENT
Start: 2022-10-04 | End: 2022-10-07

## 2022-10-04 RX ORDER — HEPARIN SODIUM 5000 [USP'U]/ML
5000 INJECTION INTRAVENOUS; SUBCUTANEOUS EVERY 8 HOURS
Refills: 0 | Status: DISCONTINUED | OUTPATIENT
Start: 2022-10-04 | End: 2022-10-07

## 2022-10-04 RX ORDER — ONDANSETRON 8 MG/1
4 TABLET, FILM COATED ORAL EVERY 6 HOURS
Refills: 0 | Status: DISCONTINUED | OUTPATIENT
Start: 2022-10-04 | End: 2022-10-07

## 2022-10-04 RX ORDER — METRONIDAZOLE 500 MG
500 TABLET ORAL EVERY 8 HOURS
Refills: 0 | Status: DISCONTINUED | OUTPATIENT
Start: 2022-10-04 | End: 2022-10-06

## 2022-10-04 RX ADMIN — HEPARIN SODIUM 5000 UNIT(S): 5000 INJECTION INTRAVENOUS; SUBCUTANEOUS at 06:35

## 2022-10-04 RX ADMIN — SODIUM CHLORIDE 120 MILLILITER(S): 9 INJECTION, SOLUTION INTRAVENOUS at 05:23

## 2022-10-04 RX ADMIN — Medication 100 MILLIGRAM(S): at 06:35

## 2022-10-04 RX ADMIN — Medication 250 MILLIGRAM(S): at 05:13

## 2022-10-04 RX ADMIN — HEPARIN SODIUM 5000 UNIT(S): 5000 INJECTION INTRAVENOUS; SUBCUTANEOUS at 15:00

## 2022-10-04 RX ADMIN — Medication 650 MILLIGRAM(S): at 01:00

## 2022-10-04 RX ADMIN — Medication 650 MILLIGRAM(S): at 00:30

## 2022-10-04 RX ADMIN — Medication 100 MILLIGRAM(S): at 15:00

## 2022-10-04 RX ADMIN — PIPERACILLIN AND TAZOBACTAM 200 GRAM(S): 4; .5 INJECTION, POWDER, LYOPHILIZED, FOR SOLUTION INTRAVENOUS at 00:30

## 2022-10-04 NOTE — H&P ADULT - HISTORY OF PRESENT ILLNESS
76 y/o f with no sign. PMHx  presents to the ED with Abd pain x 4 days. Pain is mostly postprandial in the epigastric area described as sharp and persistent , referred to the RUQ  with associated nausea, multiple NBNB vomiting . Denies fever chills, chest pain, shortness of breath, urinary complaints.  No history of abdominal surgeries in the past.  Also notes her urine has been dark yellow.  But no dysuria or urinary frequency. No other complaints at this time .

## 2022-10-04 NOTE — H&P ADULT - NSICDXFAMILYHX_GEN_ALL_CORE_FT
FAMILY HISTORY:  Family history of hyperlipidemia    Father  Still living? Unknown  Family history of diabetes mellitus, Age at diagnosis: Age Unknown  Family history of heart disease, Age at diagnosis: Age Unknown

## 2022-10-04 NOTE — H&P ADULT - NSICDXPASTSURGICALHX_GEN_ALL_CORE_FT
PAST SURGICAL HISTORY:  Hidradenitis suppurativa right axilla and groin    S/P arthroscopy of left knee     S/P breast biopsy, right     S/P carpal tunnel release right    S/P hip replacement right    S/P spinal surgery lumbar

## 2022-10-04 NOTE — H&P ADULT - VTE RISK ASSESSMENT
VTE Assessment already completed for this visit
Acute gastritis without hemorrhage, unspecified gastritis type

## 2022-10-04 NOTE — H&P ADULT - NSHPPHYSICALEXAM_GEN_ALL_CORE
Vital Signs Last 24 Hrs  T(C): 36.9 (04 Oct 2022 05:30), Max: 38 (03 Oct 2022 20:44)  T(F): 98.5 (04 Oct 2022 05:30), Max: 100.4 (03 Oct 2022 20:44)  HR: 75 (04 Oct 2022 05:30) (75 - 101)  BP: 104/66 (04 Oct 2022 05:30) (104/66 - 136/93)  BP(mean): --  RR: 18 (04 Oct 2022 05:30) (18 - 18)  SpO2: 96% (04 Oct 2022 05:30) (96% - 98%)    Parameters below as of 04 Oct 2022 05:30  Patient On (Oxygen Delivery Method): room air        General:  A&Ox3,Appears stated age, No acute distress,  Head: NC/AT  EENT: PERRLA. EOMI. Conjunctiva and sclera clear. Pharynx clear.  Neck: Supple. No JVD  Lungs: CTA B/l. Nonlabored Respirations  CV: +S1S2, RRR  Abdomen: Soft, Nondistended,  ++epigastric /RUQ tenderness, no guarding, no rebound  Extremities: Warm and well perfused. 2+ peripheral pulses b/l. Calf soft, nontender b/l. No pedal edema.

## 2022-10-04 NOTE — ED PROVIDER NOTE - CLINICAL SUMMARY MEDICAL DECISION MAKING FREE TEXT BOX
Concern for gallbladder pathology.  Antiemetic.  Pain medication dispo based on results and reassessment.    Noted to have elevated AST ALT alk phos and bili.  And nonfebrile.  We will send blood cultures, give Zosyn and obtain CT.

## 2022-10-04 NOTE — ED PROVIDER NOTE - OBJECTIVE STATEMENT
77-year-old female presents with couple of days of upper abdominal pain, nausea vomiting anytime she eats.  No fever chills, chest pain, shortness of breath, urinary complaints.  No history of abdominal surgeries in the past.  Also notes her urine has been dark yellow.  But no dysuria or urinary frequency.

## 2022-10-04 NOTE — H&P ADULT - NSHPLABSRESULTS_GEN_ALL_CORE
15.6   12.87 )-----------( 267      ( 03 Oct 2022 20:30 )             48.4   10-03    138  |  106  |  7   ----------------------------<  112<H>  4.3   |  23  |  0.82    Ca    9.5      03 Oct 2022 20:30    TPro  8.5<H>  /  Alb  3.5  /  TBili  6.6<H>  /  DBili  x   /  AST  428<H>  /  ALT  553<H>  /  AlkPhos  324<H>  10-03      < from: CT Abdomen and Pelvis w/ IV Cont (10.04.22 @ 04:08) >    FINDINGS:  LOWER CHEST: Mild bibasilar atelectasis.  LIVER: Within normal limits.  BILE DUCTS: Normal caliber.  GALLBLADDER: Gallbladder wall nodular thickening and enhancement with   gallbladder wall edema/pericholecystic fluid. Enhancement of the cystic   duct also seen. In addition, a few small bubbles of air density are seen   within the gallbladder wall and/or lumen.  SPLEEN: Within normal limits.  PANCREAS: Within normal limits.  ADRENALS: Nonspecific 1.9 cm right adrenal gland nodule. Nonspecific 2.4   cm medial left adrenal gland nodule and nonspecific 2 cm lateral left   adrenal gland nodule.  KIDNEYS/URETERS: Kidneys enhance symmetrically without hydronephrosis.   Tiny low-attenuation renal structures are too small to characterize.   Punctate nonobstructing upper pole right renal stone is seen. Ureters are   not dilated.    BLADDER: Limited evaluation of the pelvic contents including bladder due   to metallic streak artifact from bilateral hip prostheses.  REPRODUCTIVE ORGANS: Limited    BOWEL: Evaluation of bowel is limited due to the lack of oral contrast,   however there is no bowel obstruction. Colonic diverticulosis without   acute diverticulitis. There is segmental thickening of the sigmoid colon   in a region of multiple diverticula, question chronic diverticular   disease, underlying neoplasm cannot be excluded. Recommend further   evaluation with colonoscopy. Normal appendix without appendicitis. Small   bowel loops are not dilated. Large hiatal hernia.  PERITONEUM: No free air. Trace ascites adjacent to the hepatic tip versus   artifact.  VESSELS:  No abdominal aortic aneurysm or dissection.  RETROPERITONEUM: No lymphadenopathy.  ABDOMINAL WALL: Moderate fat-containing periumbilical hernia. Calcified   buttock granulomas are also seen.  BONES: Bilateral hip prosthesis. Multilevel degenerative changes of the   spine. Grade 1 anterolisthesis of L4 on L5.    IMPRESSION:    Abnormal appearance of the gallbladder which demonstrates thickened and   enhancing wall with gallbladder wall edema and small amount of   wall/intraluminal gas. Correlate with emphysematous cholecystitis.   Recommend surgical consult.  Dr. Brock notified Dr. Saucedo on 10/4/2022 at 4:42 AM Eastern standard   time with read back.    There is segmental thickening of the sigmoid colon in a region of   multiple diverticula, question chronic diverticular disease, underlying   neoplasm cannot be excluded. Recommend further evaluation with   colonoscopy.    Nonspecific bilateral adrenal gland nodules.      < end of copied text >

## 2022-10-04 NOTE — ED PROVIDER NOTE - PHYSICAL EXAMINATION
Gen: non toxic appearing, NAD   Head: NC/NT  Eyes: anicteric  ENT: airway patent, mmm  CV: RRR, +S1/S2  Resp: CTAB, symmetric breath sounds  GI: abdomen soft non-distended, +ruq TTP, no masses, no rebound, no guarding  Back: no CVA tenderness  Extremities -no edema  Neuro: A&Ox4

## 2022-10-04 NOTE — ED PROVIDER NOTE - INTERNATIONAL TRAVEL
No [Annual Wellness Visit] : an annual wellness visit [FreeTextEntry1] : Patient presents for Medicare Wellness Exam

## 2022-10-04 NOTE — ED PROVIDER NOTE - PROGRESS NOTE DETAILS
ADDENDUM by Dara Saucedo M.D.: I received sign-off from Dr. Ayers. 77yoF with upper abd pain, elevated bili/LFT's, I was to f/u. 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 Pt NAD, well appearing, laying comfortably in bed, no WOB. Seen by Jade of surg and will admit.

## 2022-10-05 ENCOUNTER — TRANSCRIPTION ENCOUNTER (OUTPATIENT)
Age: 77
End: 2022-10-05

## 2022-10-05 LAB
ALBUMIN SERPL ELPH-MCNC: 2.9 G/DL — LOW (ref 3.5–5)
ALP SERPL-CCNC: 251 U/L — HIGH (ref 40–120)
ALT FLD-CCNC: 314 U/L DA — HIGH (ref 10–60)
ANION GAP SERPL CALC-SCNC: 8 MMOL/L — SIGNIFICANT CHANGE UP (ref 5–17)
AST SERPL-CCNC: 149 U/L — HIGH (ref 10–40)
BILIRUB SERPL-MCNC: 2.1 MG/DL — HIGH (ref 0.2–1.2)
BUN SERPL-MCNC: 4 MG/DL — LOW (ref 7–18)
CALCIUM SERPL-MCNC: 9.7 MG/DL — SIGNIFICANT CHANGE UP (ref 8.4–10.5)
CHLORIDE SERPL-SCNC: 109 MMOL/L — HIGH (ref 96–108)
CO2 SERPL-SCNC: 25 MMOL/L — SIGNIFICANT CHANGE UP (ref 22–31)
CREAT SERPL-MCNC: 0.67 MG/DL — SIGNIFICANT CHANGE UP (ref 0.5–1.3)
CULTURE RESULTS: NO GROWTH — SIGNIFICANT CHANGE UP
EGFR: 91 ML/MIN/1.73M2 — SIGNIFICANT CHANGE UP
GLUCOSE SERPL-MCNC: 87 MG/DL — SIGNIFICANT CHANGE UP (ref 70–99)
HCT VFR BLD CALC: 40.4 % — SIGNIFICANT CHANGE UP (ref 34.5–45)
HCV AB S/CO SERPL IA: 0.11 S/CO — SIGNIFICANT CHANGE UP (ref 0–0.99)
HCV AB SERPL-IMP: SIGNIFICANT CHANGE UP
HGB BLD-MCNC: 12.9 G/DL — SIGNIFICANT CHANGE UP (ref 11.5–15.5)
MCHC RBC-ENTMCNC: 30.4 PG — SIGNIFICANT CHANGE UP (ref 27–34)
MCHC RBC-ENTMCNC: 31.9 GM/DL — LOW (ref 32–36)
MCV RBC AUTO: 95.1 FL — SIGNIFICANT CHANGE UP (ref 80–100)
NRBC # BLD: 0 /100 WBCS — SIGNIFICANT CHANGE UP (ref 0–0)
PLATELET # BLD AUTO: 202 K/UL — SIGNIFICANT CHANGE UP (ref 150–400)
POTASSIUM SERPL-MCNC: 3.8 MMOL/L — SIGNIFICANT CHANGE UP (ref 3.5–5.3)
POTASSIUM SERPL-SCNC: 3.8 MMOL/L — SIGNIFICANT CHANGE UP (ref 3.5–5.3)
PROT SERPL-MCNC: 6.6 G/DL — SIGNIFICANT CHANGE UP (ref 6–8.3)
RBC # BLD: 4.25 M/UL — SIGNIFICANT CHANGE UP (ref 3.8–5.2)
RBC # FLD: 14.5 % — SIGNIFICANT CHANGE UP (ref 10.3–14.5)
SARS-COV-2 RNA SPEC QL NAA+PROBE: SIGNIFICANT CHANGE UP
SODIUM SERPL-SCNC: 142 MMOL/L — SIGNIFICANT CHANGE UP (ref 135–145)
SPECIMEN SOURCE: SIGNIFICANT CHANGE UP
WBC # BLD: 7.1 K/UL — SIGNIFICANT CHANGE UP (ref 3.8–10.5)
WBC # FLD AUTO: 7.1 K/UL — SIGNIFICANT CHANGE UP (ref 3.8–10.5)

## 2022-10-05 PROCEDURE — 99222 1ST HOSP IP/OBS MODERATE 55: CPT

## 2022-10-05 PROCEDURE — 74183 MRI ABD W/O CNTR FLWD CNTR: CPT | Mod: 26

## 2022-10-05 RX ADMIN — Medication 100 MILLIGRAM(S): at 06:12

## 2022-10-05 RX ADMIN — SODIUM CHLORIDE 120 MILLILITER(S): 9 INJECTION, SOLUTION INTRAVENOUS at 02:13

## 2022-10-05 RX ADMIN — Medication 100 MILLIGRAM(S): at 21:29

## 2022-10-05 RX ADMIN — HYDROMORPHONE HYDROCHLORIDE 0.5 MILLIGRAM(S): 2 INJECTION INTRAMUSCULAR; INTRAVENOUS; SUBCUTANEOUS at 23:39

## 2022-10-05 RX ADMIN — HEPARIN SODIUM 5000 UNIT(S): 5000 INJECTION INTRAVENOUS; SUBCUTANEOUS at 21:30

## 2022-10-05 RX ADMIN — HEPARIN SODIUM 5000 UNIT(S): 5000 INJECTION INTRAVENOUS; SUBCUTANEOUS at 06:15

## 2022-10-05 RX ADMIN — HEPARIN SODIUM 5000 UNIT(S): 5000 INJECTION INTRAVENOUS; SUBCUTANEOUS at 00:03

## 2022-10-05 RX ADMIN — HYDROMORPHONE HYDROCHLORIDE 0.5 MILLIGRAM(S): 2 INJECTION INTRAMUSCULAR; INTRAVENOUS; SUBCUTANEOUS at 23:09

## 2022-10-05 RX ADMIN — HEPARIN SODIUM 5000 UNIT(S): 5000 INJECTION INTRAVENOUS; SUBCUTANEOUS at 16:06

## 2022-10-05 RX ADMIN — ONDANSETRON 4 MILLIGRAM(S): 8 TABLET, FILM COATED ORAL at 23:09

## 2022-10-05 RX ADMIN — SODIUM CHLORIDE 120 MILLILITER(S): 9 INJECTION, SOLUTION INTRAVENOUS at 06:15

## 2022-10-05 RX ADMIN — Medication 100 MILLIGRAM(S): at 00:03

## 2022-10-05 RX ADMIN — Medication 100 MILLIGRAM(S): at 16:07

## 2022-10-05 NOTE — CONSULT NOTE ADULT - SUBJECTIVE AND OBJECTIVE BOX
Patient is a 77y old  Female who presents with a chief complaint of       HPI:  76 y/o f with no sign. PMHx  presents to the ED with Abd pain x 4 days. Pain is mostly postprandial in the epigastric area described as sharp and persistent , referred to the RUQ  with associated nausea, multiple NBNB vomiting . Denies fever chills, chest pain, shortness of breath, urinary complaints.  No history of abdominal surgeries in the past.  Also notes her urine has been dark yellow.  But no dysuria or urinary frequency. No other complaints at this time .  (04 Oct 2022 05:53)      REVIEW OF SYSTEMS  Constitutional:   No fever, no fatigue, no pallor, no night sweats, no weight loss.  HEENT:   No eye pain, no vision changes, no icterus, no mouth ulcers.  Respiratory:   No shortness of breath, no cough, no respiratory distress.   Cardiovascular:   No chest pain, no palpitations.   Gastrointestinal: No abdominal pain, no nausea, no vomiting , no diahrrea, no constipation, no hematochezia,no melena.  Skin:   No rashes, no jaundice, no eczema.   Musculoskeletal:   No joint pain, no swelling, no myalgia.   Neurologic:   No headache, no seizure, no weakness.   Genitourinary:   No dysuria, no decreased urine output.  Psychiatric:  No depression, no anxiety,   Endocrine:   No thyroid disease, no diabetes.  Heme/Lymphatic:   No anemia, no blood transfusions, no lymph node enlargement, no bleeding, no bruising.  ___________________________________________________________________________________________  Allergies    adhesives (Rash)  Aleve (Rash)  Celebrex (Short breath)    Intolerances      MEDICATIONS  (STANDING):  dextrose 5% + sodium chloride 0.45%. 1000 milliLiter(s) (120 mL/Hr) IV Continuous <Continuous>  heparin   Injectable 5000 Unit(s) SubCutaneous every 8 hours  levoFLOXacin IVPB 750 milliGRAM(s) IV Intermittent every 24 hours  metroNIDAZOLE  IVPB 500 milliGRAM(s) IV Intermittent every 8 hours    MEDICATIONS  (PRN):  HYDROmorphone  Injectable 0.5 milliGRAM(s) IV Push every 4 hours PRN Moderate Pain (4 - 6)  ondansetron Injectable 4 milliGRAM(s) IV Push every 6 hours PRN Nausea      PAST MEDICAL & SURGICAL HISTORY:  GERD (gastroesophageal reflux disease)      Arthritis      S/P breast biopsy, right      S/P carpal tunnel release  right      S/P hip replacement  right      S/P arthroscopy of left knee      S/P spinal surgery  lumbar      Hidradenitis suppurativa  right axilla and groin        FAMILY HISTORY:  Family history of diabetes mellitus (Father)    Family history of heart disease (Father)    Family history of hyperlipidemia      Social History: No hsitory of : Tobacco use, IVDA, EToH  ______________________________________________________________________________________    PHYSICAL EXAM    Daily     Daily   BMI: 30.5 (10-03 @ 18:52)  Change in Weight:  Vital Signs Last 24 Hrs  T(C): 36.6 (05 Oct 2022 14:29), Max: 36.8 (04 Oct 2022 21:03)  T(F): 97.9 (05 Oct 2022 14:29), Max: 98.3 (05 Oct 2022 05:17)  HR: 70 (05 Oct 2022 14:29) (70 - 80)  BP: 123/84 (05 Oct 2022 14:29) (106/70 - 143/81)  BP(mean): --  RR: 16 (05 Oct 2022 14:29) (16 - 18)  SpO2: 95% (05 Oct 2022 14:29) (95% - 98%)    Parameters below as of 05 Oct 2022 14:29  Patient On (Oxygen Delivery Method): room air        General:  Well developed, well nourished, alert and active, no pallor, NAD.  HEENT:    Normal appearance of conjunctiva, ears, nose, lips, oropharynx, and oral mucosa, anicteric.  Neck:  No masses, no asymmetry.  Lymph Nodes:  No lymphadenopathy.   Cardiovascular:  RRR normal S1/S2, no murmur.  Respiratory:  CTA B/L, normal respiratory effort.   Abdominal:   soft, no masses or tenderness, normoactive BS, NT/ND, no HSM.  Extremities:   No clubbing or cyanosis, normal capillary refill, no edema.   Skin:   No rash, jaundice, lesions, eczema.   Musculoskeletal:  No joint swelling, erythema or tenderness.   Neuro: No focal deficits.   Other:   _______________________________________________________________________________________________  Lab Results:                          12.9   7.10  )-----------( 202      ( 05 Oct 2022 05:59 )             40.4     10-05    142  |  109<H>  |  4<L>  ----------------------------<  87  3.8   |  25  |  0.67    Ca    9.7      05 Oct 2022 05:59  Phos  2.5     10-04  Mg     2.2     10-04    TPro  6.6  /  Alb  2.9<L>  /  TBili  2.1<H>  /  DBili  x   /  AST  149<H>  /  ALT  314<H>  /  AlkPhos  251<H>  10-05    LIVER FUNCTIONS - ( 05 Oct 2022 05:59 )  Alb: 2.9 g/dL / Pro: 6.6 g/dL / ALK PHOS: 251 U/L / ALT: 314 U/L DA / AST: 149 U/L / GGT: x           PT/INR - ( 03 Oct 2022 20:30 )   PT: 12.3 sec;   INR: 1.03 ratio         PTT - ( 03 Oct 2022 20:30 )  PTT:33.0 sec            RADIOLOGY RESULTS:  ______________________________________________________________________________________  < from: CT Abdomen and Pelvis w/ IV Cont (10.04.22 @ 04:08) >  FINDINGS:  LOWER CHEST: Mild bibasilar atelectasis.  LIVER: Within normal limits.  BILE DUCTS: Normal caliber.  GALLBLADDER: Gallbladder wall nodular thickening and enhancement with   gallbladder wall edema/pericholecystic fluid. Enhancement of the cystic   duct also seen. In addition, a few small bubbles of air density are seen   within the gallbladder wall and/or lumen.  SPLEEN: Within normal limits.  PANCREAS: Within normal limits.  ADRENALS: Nonspecific 1.9 cm right adrenal gland nodule. Nonspecific 2.4   cm medial left adrenal gland nodule and nonspecific 2 cm lateral left   adrenal gland nodule.  KIDNEYS/URETERS: Kidneys enhance symmetrically without hydronephrosis.   Tiny low-attenuation renal structures are too small to characterize.   Punctate nonobstructing upper pole right renal stone is seen. Ureters are   not dilated.    BLADDER: Limited evaluation of the pelvic contents including bladder due   to metallic streak artifact from bilateral hip prostheses.  REPRODUCTIVE ORGANS: Limited    BOWEL: Evaluation of bowel is limited due to the lack of oral contrast,   however there is no bowel obstruction. Colonic diverticulosis without   acute diverticulitis. There is segmental thickening of the sigmoid colon   in a region of multiple diverticula, question chronic diverticular   disease, underlying neoplasm cannot be excluded. Recommend further   evaluation with colonoscopy. Normal appendix without appendicitis. Small   bowel loops are not dilated. Large hiatal hernia.  PERITONEUM: No free air. Trace ascites adjacent to the hepatic tip versus   artifact.  VESSELS:  No abdominal aortic aneurysm or dissection.  RETROPERITONEUM: No lymphadenopathy.  ABDOMINAL WALL: Moderate fat-containing periumbilical hernia. Calcified   buttock granulomas are also seen.  BONES: Bilateral hip prosthesis. Multilevel degenerative changes of the   spine. Grade 1 anterolisthesis of L4 on L5.    IMPRESSION:  Abnormal appearance of the gallbladder which demonstrates thickened and   enhancing wall with gallbladder wall edema and small amount of   wall/intraluminal gas. Correlate with emphysematous cholecystitis.   Recommend surgical consult.  Dr. Brock notified Dr. Saucedo on 10/4/2022 at 4:42 AM Eastern standard   time with read back.    There is segmental thickening of the sigmoid colon in a region of   multiple diverticula, question chronic diverticular disease, underlying   neoplasm cannot be excluded. Recommend further evaluation with   colonoscopy.    Nonspecific bilateral adrenal gland nodules.  ______________________________________________________________________________________  < from: US Hepatic & Pancreatic (10.04.22 @ 10:18) >  FINDINGS:  Liver: Within normal limits.  Bile ducts: Normal caliber. Common bile duct measures 4 mm.  Gallbladder: Thickened gallbladder wall. 6 mm intraluminal echogenic   focus, indeterminate. Negative sonographic Flower's sign. CT imaging   demonstrated emphysematous cholecystitis.  Pancreas: Visualized portions are within normal limits.  Right kidney: 10.5 cm. No hydronephrosis.  Ascites: None.  IVC: Visualized portions are within normal limits.    IMPRESSION:  Only gallbladder wall thickening and intraluminal echoes are   sonographically identified in this patient with emphysematous   cholecystitis on CT imaging.  ______________________________________________________________________________________  < from: MR MRCP w/wo IV Cont (10.05.22 @ 14:17) >  FINDINGS:  LOWER CHEST: Moderately large hiatal hernia.    LIVER: Within normal limits.  BILE DUCTS: Normal caliber. No evidence of choledocholithiasis.  GALLBLADDER: Trace pericholecystic edema/inflammation. Questionable   possibility of gallstones. Mural enhancement involving the gallbladder   and the region of cystic duct.  SPLEEN: Within normal limits.  PANCREAS: Within normal limits. Divisum morphology.  ADRENALS: Bilateral adrenal adenomata.  KIDNEYS/URETERS: A few small cysts bilaterally.    VISUALIZED PORTIONS:  BOWEL: Uncomplicated diverticula.  PERITONEUM: No ascites.  VESSELS: Within normal limits.  RETROPERITONEUM/LYMPH NODES: No lymphadenopathy.  ABDOMINAL WALL: Fat-containing umbilical hernia  BONES: Degenerative changes. Spinal stenosis at L4-5 level.    IMPRESSION:  No evidence of choledocholithiasis or biliary obstruction.  Trace pericholecystic fluid and mural enhancement. Only questionable   possibility of cholelithiasis.                     Patient is a 77y old  Female who presents with a chief complaint of       HPI:  76 y/o f with no sign. PMHx  presents to the ED with Abd pain x 4 days. Pain is mostly postprandial in the epigastric area described as sharp and persistent , referred to the RUQ  with associated nausea, multiple NBNB vomiting . Denies fever chills, chest pain, shortness of breath, urinary complaints.  No history of abdominal surgeries in the past.  Also notes her urine has been dark yellow.  But no dysuria or urinary frequency. No other complaints at this time .  (04 Oct 2022 05:53)      REVIEW OF SYSTEMS  Constitutional:   No fever, no fatigue, no pallor, no night sweats, no weight loss.  HEENT:   No eye pain, no vision changes, no icterus, no mouth ulcers.  Respiratory:   No shortness of breath, no cough, no respiratory distress.   Cardiovascular:   No chest pain, no palpitations.   Gastrointestinal: (+) abdominal pain, improving.  No nausea, no vomiting , no diahrrea, no constipation, no hematochezia,no melena.  Skin:   No rashes, no jaundice, no eczema.   Musculoskeletal:   No joint pain, no swelling, no myalgia.   Neurologic:   No headache, no seizure, no weakness.   Genitourinary:   No dysuria, no decreased urine output.  Psychiatric:  No depression, no anxiety,   Endocrine:   No thyroid disease, no diabetes.  Heme/Lymphatic:   No anemia, no blood transfusions, no lymph node enlargement, no bleeding, no bruising.  ___________________________________________________________________________________________  Allergies    adhesives (Rash)  Aleve (Rash)  Celebrex (Short breath)    Intolerances      MEDICATIONS  (STANDING):  dextrose 5% + sodium chloride 0.45%. 1000 milliLiter(s) (120 mL/Hr) IV Continuous <Continuous>  heparin   Injectable 5000 Unit(s) SubCutaneous every 8 hours  levoFLOXacin IVPB 750 milliGRAM(s) IV Intermittent every 24 hours  metroNIDAZOLE  IVPB 500 milliGRAM(s) IV Intermittent every 8 hours    MEDICATIONS  (PRN):  HYDROmorphone  Injectable 0.5 milliGRAM(s) IV Push every 4 hours PRN Moderate Pain (4 - 6)  ondansetron Injectable 4 milliGRAM(s) IV Push every 6 hours PRN Nausea      PAST MEDICAL & SURGICAL HISTORY:  GERD (gastroesophageal reflux disease)      Arthritis      S/P breast biopsy, right      S/P carpal tunnel release  right      S/P hip replacement  right      S/P arthroscopy of left knee      S/P spinal surgery  lumbar      Hidradenitis suppurativa  right axilla and groin        FAMILY HISTORY:  Family history of diabetes mellitus (Father)    Family history of heart disease (Father)    Family history of hyperlipidemia      Social History: No hsitory of : Tobacco use, IVDA, EToH  ______________________________________________________________________________________    PHYSICAL EXAM    Daily     Daily   BMI: 30.5 (10-03 @ 18:52)  Change in Weight:  Vital Signs Last 24 Hrs  T(C): 36.6 (05 Oct 2022 14:29), Max: 36.8 (04 Oct 2022 21:03)  T(F): 97.9 (05 Oct 2022 14:29), Max: 98.3 (05 Oct 2022 05:17)  HR: 70 (05 Oct 2022 14:29) (70 - 80)  BP: 123/84 (05 Oct 2022 14:29) (106/70 - 143/81)  BP(mean): --  RR: 16 (05 Oct 2022 14:29) (16 - 18)  SpO2: 95% (05 Oct 2022 14:29) (95% - 98%)    Parameters below as of 05 Oct 2022 14:29  Patient On (Oxygen Delivery Method): room air        General:  Well developed, well nourished, alert and active, no pallor, NAD.  HEENT:    Normal appearance of conjunctiva, ears, nose, lips, oropharynx, and oral mucosa, anicteric.  Neck:  No masses, no asymmetry.  Lymph Nodes:  No lymphadenopathy.   Cardiovascular:  RRR normal S1/S2, no murmur.  Respiratory:  CTA B/L, normal respiratory effort.   Abdominal:   soft, no masses, normoactive BS, NT/ND, no HSM. (+) RUQ tenderness, improving   Extremities:   No clubbing or cyanosis, normal capillary refill, no edema.   Skin:   No rash, lesions, eczema. (+) jaundice, improving   Musculoskeletal:  No joint swelling, erythema or tenderness.   Neuro: No focal deficits.   Other:   _______________________________________________________________________________________________  Lab Results:                          12.9   7.10  )-----------( 202      ( 05 Oct 2022 05:59 )             40.4     10-05    142  |  109<H>  |  4<L>  ----------------------------<  87  3.8   |  25  |  0.67    Ca    9.7      05 Oct 2022 05:59  Phos  2.5     10-04  Mg     2.2     10-04    TPro  6.6  /  Alb  2.9<L>  /  TBili  2.1<H>  /  DBili  x   /  AST  149<H>  /  ALT  314<H>  /  AlkPhos  251<H>  10-05    LIVER FUNCTIONS - ( 05 Oct 2022 05:59 )  Alb: 2.9 g/dL / Pro: 6.6 g/dL / ALK PHOS: 251 U/L / ALT: 314 U/L DA / AST: 149 U/L / GGT: x           PT/INR - ( 03 Oct 2022 20:30 )   PT: 12.3 sec;   INR: 1.03 ratio         PTT - ( 03 Oct 2022 20:30 )  PTT:33.0 sec            RADIOLOGY RESULTS:  ______________________________________________________________________________________  < from: CT Abdomen and Pelvis w/ IV Cont (10.04.22 @ 04:08) >  FINDINGS:  LOWER CHEST: Mild bibasilar atelectasis.  LIVER: Within normal limits.  BILE DUCTS: Normal caliber.  GALLBLADDER: Gallbladder wall nodular thickening and enhancement with   gallbladder wall edema/pericholecystic fluid. Enhancement of the cystic   duct also seen. In addition, a few small bubbles of air density are seen   within the gallbladder wall and/or lumen.  SPLEEN: Within normal limits.  PANCREAS: Within normal limits.  ADRENALS: Nonspecific 1.9 cm right adrenal gland nodule. Nonspecific 2.4   cm medial left adrenal gland nodule and nonspecific 2 cm lateral left   adrenal gland nodule.  KIDNEYS/URETERS: Kidneys enhance symmetrically without hydronephrosis.   Tiny low-attenuation renal structures are too small to characterize.   Punctate nonobstructing upper pole right renal stone is seen. Ureters are   not dilated.    BLADDER: Limited evaluation of the pelvic contents including bladder due   to metallic streak artifact from bilateral hip prostheses.  REPRODUCTIVE ORGANS: Limited    BOWEL: Evaluation of bowel is limited due to the lack of oral contrast,   however there is no bowel obstruction. Colonic diverticulosis without   acute diverticulitis. There is segmental thickening of the sigmoid colon   in a region of multiple diverticula, question chronic diverticular   disease, underlying neoplasm cannot be excluded. Recommend further   evaluation with colonoscopy. Normal appendix without appendicitis. Small   bowel loops are not dilated. Large hiatal hernia.  PERITONEUM: No free air. Trace ascites adjacent to the hepatic tip versus   artifact.  VESSELS:  No abdominal aortic aneurysm or dissection.  RETROPERITONEUM: No lymphadenopathy.  ABDOMINAL WALL: Moderate fat-containing periumbilical hernia. Calcified   buttock granulomas are also seen.  BONES: Bilateral hip prosthesis. Multilevel degenerative changes of the   spine. Grade 1 anterolisthesis of L4 on L5.    IMPRESSION:  Abnormal appearance of the gallbladder which demonstrates thickened and   enhancing wall with gallbladder wall edema and small amount of   wall/intraluminal gas. Correlate with emphysematous cholecystitis.   Recommend surgical consult.  Dr. Brock notified Dr. Saucedo on 10/4/2022 at 4:42 AM Eastern standard   time with read back.    There is segmental thickening of the sigmoid colon in a region of   multiple diverticula, question chronic diverticular disease, underlying   neoplasm cannot be excluded. Recommend further evaluation with   colonoscopy.    Nonspecific bilateral adrenal gland nodules.  ______________________________________________________________________________________  < from: US Hepatic & Pancreatic (10.04.22 @ 10:18) >  FINDINGS:  Liver: Within normal limits.  Bile ducts: Normal caliber. Common bile duct measures 4 mm.  Gallbladder: Thickened gallbladder wall. 6 mm intraluminal echogenic   focus, indeterminate. Negative sonographic Flower's sign. CT imaging   demonstrated emphysematous cholecystitis.  Pancreas: Visualized portions are within normal limits.  Right kidney: 10.5 cm. No hydronephrosis.  Ascites: None.  IVC: Visualized portions are within normal limits.    IMPRESSION:  Only gallbladder wall thickening and intraluminal echoes are   sonographically identified in this patient with emphysematous   cholecystitis on CT imaging.  ______________________________________________________________________________________  < from: MR MRCP w/wo IV Cont (10.05.22 @ 14:17) >  FINDINGS:  LOWER CHEST: Moderately large hiatal hernia.    LIVER: Within normal limits.  BILE DUCTS: Normal caliber. No evidence of choledocholithiasis.  GALLBLADDER: Trace pericholecystic edema/inflammation. Questionable   possibility of gallstones. Mural enhancement involving the gallbladder   and the region of cystic duct.  SPLEEN: Within normal limits.  PANCREAS: Within normal limits. Divisum morphology.  ADRENALS: Bilateral adrenal adenomata.  KIDNEYS/URETERS: A few small cysts bilaterally.    VISUALIZED PORTIONS:  BOWEL: Uncomplicated diverticula.  PERITONEUM: No ascites.  VESSELS: Within normal limits.  RETROPERITONEUM/LYMPH NODES: No lymphadenopathy.  ABDOMINAL WALL: Fat-containing umbilical hernia  BONES: Degenerative changes. Spinal stenosis at L4-5 level.    IMPRESSION:  No evidence of choledocholithiasis or biliary obstruction.  Trace pericholecystic fluid and mural enhancement. Only questionable   possibility of cholelithiasis.

## 2022-10-05 NOTE — PROGRESS NOTE ADULT - NS PANP COMMENT GEN_ALL_CORE FT
Pt seen and examined. Just had MRCP.  Report still pending at time pt seen. Labs reviewed. LFTs trending down.   Abd- soft, nondistended. + RUQ and epigastric tenderness on palpation.   If ERCP planned by GI, lap herminio will follow. If not, will proceed with lap herminio

## 2022-10-05 NOTE — PROGRESS NOTE ADULT - SUBJECTIVE AND OBJECTIVE BOX
Patient seen and examined at bedside  complaining of right upper abdominal pain     Vital Signs Last 24 Hrs  T(F): 98.3 (10-05-22 @ 05:17), Max: 98.6 (10-04-22 @ 11:50)  HR: 70 (10-05-22 @ 05:17)  BP: 141/71 (10-05-22 @ 05:17)  RR: 18 (10-05-22 @ 05:17)  SpO2: 98% (10-05-22 @ 05:17)    GENERAL: Alert, NAD  CHEST/LUNG: respirations nonlabored  ABDOMEN: soft, moderate RUQ abdominal pain, Nondistended  EXTREMITIES:  no calf tenderness, No edema    I&O's Detail    LABS:                        12.9   7.10  )-----------( 202      ( 05 Oct 2022 05:59 )             40.4     10-05    142  |  109<H>  |  4<L>  ----------------------------<  87  3.8   |  25  |  0.67    Ca    9.7      05 Oct 2022 05:59  Phos  2.5     10-04  Mg     2.2     10-04    TPro  6.6  /  Alb  2.9<L>  /  TBili  2.1<H>  /  DBili  x   /  AST  149<H>  /  ALT  314<H>  /  AlkPhos  251<H>  10-05    PT/INR - ( 03 Oct 2022 20:30 )   PT: 12.3 sec;   INR: 1.03 ratio      PTT - ( 03 Oct 2022 20:30 )  PTT:33.0 sec

## 2022-10-05 NOTE — CONSULT NOTE ADULT - ASSESSMENT
77F with PMHx GERD and Arthritis p/w abdominal pain x 4d. Pain is mostly postprandial in the epigastric/ RUQ area described as sharp and persistent, with associated nausea, multiple NBNB vomiting . No history of abdominal surgeries in the past. Also notes her urine has been dark yellow, but denies any other urinary symptoms. Admitted to Surgery for evidence of emphysematous cholecystitis confirmed on imaging. GI consulted for possible choledocholithiasis, pending MRCP.     MRCP shows no evidence of choledocholithiasis.   Labs notable for downtrending LFTs and Bilirubin.    Impression:  Emphysematous cholecystitis complicated with cholelithiasis, planned for lap herminio   Elevated LFTs, downtrending   Jaundice, improving   Abdominal pain     Recommendations:  c/w planned Lap herminio  77F with PMHx GERD and Arthritis p/w abdominal pain x 4d. Pain is mostly postprandial in the epigastric/ RUQ area described as sharp and persistent, with associated nausea, multiple NBNB vomiting . No history of abdominal surgeries in the past. Also notes her urine has been dark yellow, but denies any other urinary symptoms. Admitted to Surgery for evidence of emphysematous cholecystitis confirmed on imaging. GI consulted for possible choledocholithiasis, pending MRCP.     MRCP shows no evidence of choledocholithiasis.   Labs notable for downtrending LFTs and Bilirubin.    Impression:  Emphysematous cholecystitis complicated with cholelithiasis, planned for lap herminio   Elevated LFTs, downtrending likely suggestive of passed stone  Jaundice, improving   Abdominal pain, improving    Recommendations:  Monitor LFTs and Bili daily   c/w planned Lap herminio  diet as per surgery     Thank you for the consult.

## 2022-10-05 NOTE — PROGRESS NOTE ADULT - ASSESSMENT
77 year old female with emphysematous cholecystitis, rule out choledocholithiasis    - MRCP pending  - Tbili downtrending  - monitor labs  - laparoscopic cholecystectomy planning after MRCP   - discuss with Dr. Hammonds

## 2022-10-05 NOTE — PATIENT PROFILE ADULT - FALL HARM RISK - UNIVERSAL INTERVENTIONS
Bed in lowest position, wheels locked, appropriate side rails in place/Call bell, personal items and telephone in reach/Instruct patient to call for assistance before getting out of bed or chair/Non-slip footwear when patient is out of bed/[place] to call system/Physically safe environment - no spills, clutter or unnecessary equipment/Purposeful Proactive Rounding/Room/bathroom lighting operational, light cord in reach

## 2022-10-06 ENCOUNTER — TRANSCRIPTION ENCOUNTER (OUTPATIENT)
Age: 77
End: 2022-10-06

## 2022-10-06 ENCOUNTER — RESULT REVIEW (OUTPATIENT)
Age: 77
End: 2022-10-06

## 2022-10-06 LAB
ALBUMIN SERPL ELPH-MCNC: 2.7 G/DL — LOW (ref 3.5–5)
ALP SERPL-CCNC: 226 U/L — HIGH (ref 40–120)
ALT FLD-CCNC: 212 U/L DA — HIGH (ref 10–60)
ANION GAP SERPL CALC-SCNC: 7 MMOL/L — SIGNIFICANT CHANGE UP (ref 5–17)
APTT BLD: 31.9 SEC — SIGNIFICANT CHANGE UP (ref 27.5–35.5)
AST SERPL-CCNC: 67 U/L — HIGH (ref 10–40)
BILIRUB DIRECT SERPL-MCNC: 0.9 MG/DL — HIGH (ref 0–0.3)
BILIRUB INDIRECT FLD-MCNC: 0.4 MG/DL — SIGNIFICANT CHANGE UP (ref 0.2–1)
BILIRUB SERPL-MCNC: 1.3 MG/DL — HIGH (ref 0.2–1.2)
BLD GP AB SCN SERPL QL: SIGNIFICANT CHANGE UP
BUN SERPL-MCNC: 3 MG/DL — LOW (ref 7–18)
CALCIUM SERPL-MCNC: 9.6 MG/DL — SIGNIFICANT CHANGE UP (ref 8.4–10.5)
CHLORIDE SERPL-SCNC: 108 MMOL/L — SIGNIFICANT CHANGE UP (ref 96–108)
CO2 SERPL-SCNC: 26 MMOL/L — SIGNIFICANT CHANGE UP (ref 22–31)
CREAT SERPL-MCNC: 0.66 MG/DL — SIGNIFICANT CHANGE UP (ref 0.5–1.3)
EGFR: 91 ML/MIN/1.73M2 — SIGNIFICANT CHANGE UP
GLUCOSE SERPL-MCNC: 138 MG/DL — HIGH (ref 70–99)
HCT VFR BLD CALC: 40.5 % — SIGNIFICANT CHANGE UP (ref 34.5–45)
HGB BLD-MCNC: 12.9 G/DL — SIGNIFICANT CHANGE UP (ref 11.5–15.5)
INR BLD: 1.06 RATIO — SIGNIFICANT CHANGE UP (ref 0.88–1.16)
MCHC RBC-ENTMCNC: 30.4 PG — SIGNIFICANT CHANGE UP (ref 27–34)
MCHC RBC-ENTMCNC: 31.9 GM/DL — LOW (ref 32–36)
MCV RBC AUTO: 95.3 FL — SIGNIFICANT CHANGE UP (ref 80–100)
NRBC # BLD: 0 /100 WBCS — SIGNIFICANT CHANGE UP (ref 0–0)
PLATELET # BLD AUTO: 200 K/UL — SIGNIFICANT CHANGE UP (ref 150–400)
POTASSIUM SERPL-MCNC: 3.8 MMOL/L — SIGNIFICANT CHANGE UP (ref 3.5–5.3)
POTASSIUM SERPL-SCNC: 3.8 MMOL/L — SIGNIFICANT CHANGE UP (ref 3.5–5.3)
PROT SERPL-MCNC: 6.4 G/DL — SIGNIFICANT CHANGE UP (ref 6–8.3)
PROTHROM AB SERPL-ACNC: 12.6 SEC — SIGNIFICANT CHANGE UP (ref 10.5–13.4)
RBC # BLD: 4.25 M/UL — SIGNIFICANT CHANGE UP (ref 3.8–5.2)
RBC # FLD: 14.6 % — HIGH (ref 10.3–14.5)
SODIUM SERPL-SCNC: 141 MMOL/L — SIGNIFICANT CHANGE UP (ref 135–145)
WBC # BLD: 6.46 K/UL — SIGNIFICANT CHANGE UP (ref 3.8–10.5)
WBC # FLD AUTO: 6.46 K/UL — SIGNIFICANT CHANGE UP (ref 3.8–10.5)

## 2022-10-06 PROCEDURE — 47563 LAPARO CHOLECYSTECTOMY/GRAPH: CPT | Mod: AS

## 2022-10-06 PROCEDURE — 47563 LAPARO CHOLECYSTECTOMY/GRAPH: CPT

## 2022-10-06 PROCEDURE — 88304 TISSUE EXAM BY PATHOLOGIST: CPT | Mod: 26

## 2022-10-06 PROCEDURE — 99232 SBSQ HOSP IP/OBS MODERATE 35: CPT

## 2022-10-06 DEVICE — CLIP APPLIER COVIDIEN ENDOCLIP II 10MM MED/LG: Type: IMPLANTABLE DEVICE | Status: FUNCTIONAL

## 2022-10-06 RX ORDER — SODIUM CHLORIDE 9 MG/ML
1000 INJECTION, SOLUTION INTRAVENOUS
Refills: 0 | Status: DISCONTINUED | OUTPATIENT
Start: 2022-10-06 | End: 2022-10-06

## 2022-10-06 RX ORDER — FENTANYL CITRATE 50 UG/ML
50 INJECTION INTRAVENOUS
Refills: 0 | Status: DISCONTINUED | OUTPATIENT
Start: 2022-10-06 | End: 2022-10-06

## 2022-10-06 RX ORDER — FENTANYL CITRATE 50 UG/ML
25 INJECTION INTRAVENOUS
Refills: 0 | Status: DISCONTINUED | OUTPATIENT
Start: 2022-10-06 | End: 2022-10-06

## 2022-10-06 RX ORDER — ONDANSETRON 8 MG/1
4 TABLET, FILM COATED ORAL ONCE
Refills: 0 | Status: DISCONTINUED | OUTPATIENT
Start: 2022-10-06 | End: 2022-10-06

## 2022-10-06 RX ORDER — ACETAMINOPHEN 500 MG
1000 TABLET ORAL ONCE
Refills: 0 | Status: COMPLETED | OUTPATIENT
Start: 2022-10-06 | End: 2022-10-06

## 2022-10-06 RX ADMIN — FENTANYL CITRATE 25 MICROGRAM(S): 50 INJECTION INTRAVENOUS at 22:27

## 2022-10-06 RX ADMIN — ONDANSETRON 4 MILLIGRAM(S): 8 TABLET, FILM COATED ORAL at 22:31

## 2022-10-06 RX ADMIN — Medication 400 MILLIGRAM(S): at 22:27

## 2022-10-06 RX ADMIN — Medication 100 MILLIGRAM(S): at 14:14

## 2022-10-06 RX ADMIN — Medication 100 MILLIGRAM(S): at 06:00

## 2022-10-06 RX ADMIN — HEPARIN SODIUM 5000 UNIT(S): 5000 INJECTION INTRAVENOUS; SUBCUTANEOUS at 14:14

## 2022-10-06 RX ADMIN — FENTANYL CITRATE 25 MICROGRAM(S): 50 INJECTION INTRAVENOUS at 22:12

## 2022-10-06 NOTE — PROGRESS NOTE ADULT - SUBJECTIVE AND OBJECTIVE BOX
INTERVAL HPI/OVERNIGHT EVENTS:  Pt seen and examined at bedside.   Pt resting comfortably. Reporting that she is hungry   NPO   Denies N/V    MEDICATIONS  (STANDING):  dextrose 5% + sodium chloride 0.45%. 1000 milliLiter(s) (120 mL/Hr) IV Continuous <Continuous>  heparin   Injectable 5000 Unit(s) SubCutaneous every 8 hours  levoFLOXacin IVPB 750 milliGRAM(s) IV Intermittent every 24 hours  metroNIDAZOLE  IVPB 500 milliGRAM(s) IV Intermittent every 8 hours    MEDICATIONS  (PRN):  HYDROmorphone  Injectable 0.5 milliGRAM(s) IV Push every 4 hours PRN Moderate Pain (4 - 6)  ondansetron Injectable 4 milliGRAM(s) IV Push every 6 hours PRN Nausea      Vital Signs Last 24 Hrs  T(C): 37.3 (06 Oct 2022 05:32), Max: 37.3 (06 Oct 2022 05:32)  T(F): 99.1 (06 Oct 2022 05:32), Max: 99.1 (06 Oct 2022 05:32)  HR: 59 (06 Oct 2022 05:32) (59 - 87)  BP: 134/64 (06 Oct 2022 05:32) (123/84 - 134/64)  BP(mean): --  RR: 17 (06 Oct 2022 05:32) (16 - 18)  SpO2: 94% (06 Oct 2022 05:32) (94% - 99%)    Parameters below as of 06 Oct 2022 05:32  Patient On (Oxygen Delivery Method): room air        Physical:  General: A&Ox3. NAD.  Respirations: Unlabored  Abdomen: Soft nondistended, tender RUQ, no diffuse peritonitis     I&O's Detail      LABS:                        12.9   7.10  )-----------( 202      ( 05 Oct 2022 05:59 )             40.4             10-05    142  |  109<H>  |  4<L>  ----------------------------<  87  3.8   |  25  |  0.67    Ca    9.7      05 Oct 2022 05:59  Phos  2.5     10-04  Mg     2.2     10-04    TPro  6.6  /  Alb  2.9<L>  /  TBili  2.1<H>  /  DBili  x   /  AST  149<H>  /  ALT  314<H>  /  AlkPhos  251<H>  10-05    < from: MR MRCP w/wo IV Cont (10.05.22 @ 14:17) >    ACC: [id number] EXAM:  MR MRCP WAW IC                          PROCEDURE DATE:  10/05/2022          INTERPRETATION:  CLINICAL INFORMATION: Choledocholithiasis.    COMPARISON: CT and ultrasound dated 10/04/2022.    CONTRAST/COMPLICATIONS:  IV Contrast: Gadavist  7.5 cc administered   0 cc discarded  Oral Contrast: NONE  Complications: None reported at time of study completion    PROCEDURE:  MRI of the abdomen was performed.  MRCP was performed.    FINDINGS:  LOWER CHEST: Moderately large hiatal hernia.    LIVER: Within normal limits.  BILE DUCTS: Normal caliber. No evidence of choledocholithiasis.  GALLBLADDER: Trace pericholecystic edema/inflammation. Questionable   possibility of gallstones. Mural enhancement involving the gallbladder   and the region of cystic duct.  SPLEEN: Within normal limits.  PANCREAS: Within normal limits. Divisum morphology.  ADRENALS: Bilateral adrenal adenomata.  KIDNEYS/URETERS: A few small cysts bilaterally.    VISUALIZED PORTIONS:  BOWEL: Uncomplicated diverticula.  PERITONEUM: No ascites.  VESSELS: Within normal limits.  RETROPERITONEUM/LYMPH NODES: No lymphadenopathy.  ABDOMINAL WALL: Fat-containing umbilical hernia  BONES: Degenerative changes. Spinal stenosis at L4-5 level.    IMPRESSION:  No evidence of choledocholithiasis or biliary obstruction.  Trace pericholecystic fluid and mural enhancement. Only questionable   possibility of cholelithiasis.        --- End of Report ---            REDD SALVADOR MD; Attending Radiologist  This documenthas been electronically signed. Oct  5 2022  2:55PM    < end of copied text >

## 2022-10-06 NOTE — BRIEF OPERATIVE NOTE - OPERATION/FINDINGS
wound class 2  intra-operative cholangiogram negative for CBD filling defects wound class 2  intra-operative cholangiogram negative for CBD filling defects. Right and left hepatic ducts visualized.

## 2022-10-06 NOTE — PROGRESS NOTE ADULT - ASSESSMENT
77 year old female with emphysematous cholecystitis, rule out choledocholithiasis, s/p MRCP reviewed with attending no evidence of choledocholithiasis     -F/u AM labs   -GI recs appreciated, no plan for ERCP at this time   -Lap herminio planning  -Preop labs   -Pain control PRN   -DVT ppx  -Discussed with Dr. Hammonds

## 2022-10-06 NOTE — PROGRESS NOTE ADULT - ASSESSMENT
77F with PMHx GERD and Arthritis p/w abdominal pain x 4d. Pain is mostly postprandial in the epigastric/ RUQ area described as sharp and persistent, with associated nausea, multiple NBNB vomiting . No history of abdominal surgeries in the past. Also notes her urine has been dark yellow, but denies any other urinary symptoms. Admitted to Surgery for evidence of emphysematous cholecystitis confirmed on imaging. GI consulted for possible choledocholithiasis, pending MRCP.     MRCP shows no evidence of choledocholithiasis.   Labs notable for downtrending LFTs and Bilirubin.    Impression:  Emphysematous cholecystitis complicated with cholelithiasis, planned for lap herminio   Elevated LFTs, continue to downtrend likely suggestive of passed stone  Jaundice, resolved  Abdominal pain, improving    Recommendations:  Monitor LFTs and Bili daily   c/w planned Lap herminio  Diet as per surgery     Thank you for the consult. Will sign off as patient is currently improving with no active GI complaints.  77F with PMHx GERD and Arthritis p/w abdominal pain x 4d. Pain is mostly postprandial in the epigastric/ RUQ area described as sharp and persistent, with associated nausea, multiple NBNB vomiting . No history of abdominal surgeries in the past. Also notes her urine has been dark yellow, but denies any other urinary symptoms. Admitted to Surgery for evidence of emphysematous cholecystitis confirmed on imaging. GI consulted for possible choledocholithiasis, pending MRCP.     MRCP shows no evidence of choledocholithiasis.   Labs notable for downtrending LFTs and Bilirubin.  Tolerated clear liquid diet. Endorses improvement in GI symptoms.      Impression:  Emphysematous cholecystitis complicated with cholelithiasis, planned for lap herminio   Elevated LFTs, continue to downtrend likely suggestive of passed stone  Jaundice, resolved  Abdominal pain, improving    Recommendations:  Monitor LFTs and Bili daily   c/w planned Lap herminio with Surgery   Diet as per surgery     Thank you for the consult. Will sign off as patient is currently improving with no active GI complaints, and scheduled for Lap herminio with surgery.

## 2022-10-07 ENCOUNTER — TRANSCRIPTION ENCOUNTER (OUTPATIENT)
Age: 77
End: 2022-10-07

## 2022-10-07 VITALS
HEART RATE: 76 BPM | SYSTOLIC BLOOD PRESSURE: 112 MMHG | TEMPERATURE: 98 F | OXYGEN SATURATION: 95 % | DIASTOLIC BLOOD PRESSURE: 62 MMHG | RESPIRATION RATE: 16 BRPM

## 2022-10-07 LAB
ALBUMIN SERPL ELPH-MCNC: 3 G/DL — LOW (ref 3.5–5)
ALP SERPL-CCNC: 242 U/L — HIGH (ref 40–120)
ALT FLD-CCNC: 184 U/L DA — HIGH (ref 10–60)
ANION GAP SERPL CALC-SCNC: 6 MMOL/L — SIGNIFICANT CHANGE UP (ref 5–17)
AST SERPL-CCNC: 64 U/L — HIGH (ref 10–40)
BILIRUB DIRECT SERPL-MCNC: 0.7 MG/DL — HIGH (ref 0–0.3)
BILIRUB INDIRECT FLD-MCNC: 0.4 MG/DL — SIGNIFICANT CHANGE UP (ref 0.2–1)
BILIRUB SERPL-MCNC: 1.1 MG/DL — SIGNIFICANT CHANGE UP (ref 0.2–1.2)
BUN SERPL-MCNC: 5 MG/DL — LOW (ref 7–18)
CALCIUM SERPL-MCNC: 9.5 MG/DL — SIGNIFICANT CHANGE UP (ref 8.4–10.5)
CHLORIDE SERPL-SCNC: 106 MMOL/L — SIGNIFICANT CHANGE UP (ref 96–108)
CO2 SERPL-SCNC: 28 MMOL/L — SIGNIFICANT CHANGE UP (ref 22–31)
CREAT SERPL-MCNC: 0.84 MG/DL — SIGNIFICANT CHANGE UP (ref 0.5–1.3)
EGFR: 72 ML/MIN/1.73M2 — SIGNIFICANT CHANGE UP
GLUCOSE SERPL-MCNC: 197 MG/DL — HIGH (ref 70–99)
HCT VFR BLD CALC: 43.7 % — SIGNIFICANT CHANGE UP (ref 34.5–45)
HGB BLD-MCNC: 13.9 G/DL — SIGNIFICANT CHANGE UP (ref 11.5–15.5)
MCHC RBC-ENTMCNC: 30.6 PG — SIGNIFICANT CHANGE UP (ref 27–34)
MCHC RBC-ENTMCNC: 31.8 GM/DL — LOW (ref 32–36)
MCV RBC AUTO: 96.3 FL — SIGNIFICANT CHANGE UP (ref 80–100)
NRBC # BLD: 0 /100 WBCS — SIGNIFICANT CHANGE UP (ref 0–0)
PLATELET # BLD AUTO: 226 K/UL — SIGNIFICANT CHANGE UP (ref 150–400)
POTASSIUM SERPL-MCNC: 4.1 MMOL/L — SIGNIFICANT CHANGE UP (ref 3.5–5.3)
POTASSIUM SERPL-SCNC: 4.1 MMOL/L — SIGNIFICANT CHANGE UP (ref 3.5–5.3)
PROT SERPL-MCNC: 7.3 G/DL — SIGNIFICANT CHANGE UP (ref 6–8.3)
RBC # BLD: 4.54 M/UL — SIGNIFICANT CHANGE UP (ref 3.8–5.2)
RBC # FLD: 14.4 % — SIGNIFICANT CHANGE UP (ref 10.3–14.5)
SODIUM SERPL-SCNC: 140 MMOL/L — SIGNIFICANT CHANGE UP (ref 135–145)
WBC # BLD: 7.36 K/UL — SIGNIFICANT CHANGE UP (ref 3.8–10.5)
WBC # FLD AUTO: 7.36 K/UL — SIGNIFICANT CHANGE UP (ref 3.8–10.5)

## 2022-10-07 RX ORDER — ACETAMINOPHEN 500 MG
650 TABLET ORAL ONCE
Refills: 0 | Status: COMPLETED | OUTPATIENT
Start: 2022-10-07 | End: 2022-10-07

## 2022-10-07 RX ORDER — OXYCODONE HYDROCHLORIDE 5 MG/1
1 TABLET ORAL
Qty: 5 | Refills: 0
Start: 2022-10-07

## 2022-10-07 RX ORDER — PANTOPRAZOLE SODIUM 20 MG/1
40 TABLET, DELAYED RELEASE ORAL
Refills: 0 | Status: DISCONTINUED | OUTPATIENT
Start: 2022-10-07 | End: 2022-10-07

## 2022-10-07 RX ADMIN — ONDANSETRON 4 MILLIGRAM(S): 8 TABLET, FILM COATED ORAL at 17:42

## 2022-10-07 RX ADMIN — HEPARIN SODIUM 5000 UNIT(S): 5000 INJECTION INTRAVENOUS; SUBCUTANEOUS at 13:15

## 2022-10-07 RX ADMIN — HEPARIN SODIUM 5000 UNIT(S): 5000 INJECTION INTRAVENOUS; SUBCUTANEOUS at 05:57

## 2022-10-07 RX ADMIN — PANTOPRAZOLE SODIUM 40 MILLIGRAM(S): 20 TABLET, DELAYED RELEASE ORAL at 11:53

## 2022-10-07 RX ADMIN — HYDROMORPHONE HYDROCHLORIDE 0.5 MILLIGRAM(S): 2 INJECTION INTRAMUSCULAR; INTRAVENOUS; SUBCUTANEOUS at 15:49

## 2022-10-07 RX ADMIN — HYDROMORPHONE HYDROCHLORIDE 0.5 MILLIGRAM(S): 2 INJECTION INTRAMUSCULAR; INTRAVENOUS; SUBCUTANEOUS at 15:19

## 2022-10-07 NOTE — DISCHARGE NOTE PROVIDER - NSDCFUSCHEDAPPT_GEN_ALL_CORE_FT
Nelsy Hammonds Physician [facility] [address] [facility]  Scheduled Appointment: 10/26/2022

## 2022-10-07 NOTE — DISCHARGE NOTE PROVIDER - HOSPITAL COURSE
77F with PMHx GERD and Arthritis p/w abdominal pain x 4d. Pain is mostly postprandial in the epigastric/ RUQ area described as sharp and persistent, with associated nausea, multiple NBNB vomiting . No history of abdominal surgeries in the past. Also notes her urine has been dark yellow, but denies any other urinary symptoms.   Pt admitted to Surgery services for evidence of emphysematous cholecystitis confirmed on imaging. GI was consulted for possible choledocholithiasis, Tbili levels elevated. MRCP was done and showed no evidence of choledocholithiasis. Pt underwent laparoscopic cholecystectomy with IOC on 10/06/2022. Post operative period uncomplicated. Pt tolerating regular diet well, stable for discharge. Pt to follow up with Dr. Hammonds in office.

## 2022-10-07 NOTE — DISCHARGE NOTE PROVIDER - NSDCACTIVITY_GEN_ALL_CORE
[No] : No [No falls in past year] : Patient reported no falls in the past year [0] : 2) Feeling down, depressed, or hopeless: Not at all (0) [] : No [[id number]] : 0 Return to Work/School allowed/No heavy lifting/straining

## 2022-10-07 NOTE — DISCHARGE NOTE PROVIDER - NSDCCPCAREPLAN_GEN_ALL_CORE_FT
PRINCIPAL DISCHARGE DIAGNOSIS  Diagnosis: Acute emphysematous cholecystitis  Assessment and Plan of Treatment:

## 2022-10-07 NOTE — DISCHARGE NOTE NURSING/CASE MANAGEMENT/SOCIAL WORK - PATIENT PORTAL LINK FT
You can access the FollowMyHealth Patient Portal offered by [facility] by registering at the following website: [email or web address]. By joining [facility]’s FollowMyHealth portal, you will also be able to view your health information using other applications (apps) compatible with our system.

## 2022-10-07 NOTE — DISCHARGE NOTE PROVIDER - CARE PROVIDER_API CALL
Nelsy Hammonds)  Surgery  [address]  [place] [address]  Phone: [phone]  Fax: [phone]  Follow Up Time:

## 2022-10-07 NOTE — DISCHARGE NOTE PROVIDER - NSDCMRMEDTOKEN_GEN_ALL_CORE_FT
aspirin 81 mg oral tablet: 1 tab(s) orally once a day  omeprazole 40 mg oral delayed release capsule: 1 cap(s) orally once a day   aspirin 81 mg oral tablet: 1 tab(s) orally once a day  omeprazole 40 mg oral delayed release capsule: 1 cap(s) orally once a day  oxyCODONE 5 mg oral tablet: 1 tab(s) orally every 6 hours, As Needed -for severe pain MDD:4

## 2022-10-07 NOTE — PROGRESS NOTE ADULT - ASSESSMENT
78 y/o Female s/p laparoscopic cholecystectomy 10/6    -F/u AM labs   -Reg diet   -Pain medication PRN   -OOB/Ambulate   -DVT ppx   -Dc planning

## 2022-10-07 NOTE — PROGRESS NOTE ADULT - SUBJECTIVE AND OBJECTIVE BOX
POST-OPERATIVE NOTE    Subjective:   77y Female s/p lap cholecystectomy with IOC POD #0 . Seen and examined at bed side . Denies nausea, vomiting, chest pain, sob, fevers chills. Pain is well controlled. . Voiding .    Vital Signs Last 24 Hrs  T(C): 37 (07 Oct 2022 05:05), Max: 37.4 (06 Oct 2022 21:57)  T(F): 98.6 (07 Oct 2022 05:05), Max: 99.3 (06 Oct 2022 21:57)  HR: 63 (07 Oct 2022 05:05) (61 - 74)  BP: 130/79 (07 Oct 2022 05:05) (113/83 - 151/94)  BP(mean): 94 (06 Oct 2022 23:00) (81 - 103)  RR: 18 (07 Oct 2022 05:05) (18 - 22)  SpO2: 98% (07 Oct 2022 05:05) (95% - 98%)    Parameters below as of 07 Oct 2022 05:05  Patient On (Oxygen Delivery Method): room air      I&O's Detail    06 Oct 2022 07:01  -  07 Oct 2022 05:45  --------------------------------------------------------  IN:    Lactated Ringers Bolus: 1450 mL  Total IN: 1450 mL    OUT:    Indwelling Catheter - Urethral (mL): 200 mL  Total OUT: 200 mL    Total NET: 1250 mL          Physical Exam:  General: NAD, resting comfortably in bed  Pulmonary: Nonlabored breathing, no respiratory distress  Cardiovascular: NSR, S1, S2  Abdominal: soft, NT/ND, dressing c/d/i  Extremities: no edema, no calf tenderness, distal pulses are palpable     LABS:                        12.9   6.46  )-----------( 200      ( 06 Oct 2022 08:27 )             40.5     10-06    141  |  108  |  3<L>  ----------------------------<  138<H>  3.8   |  26  |  0.66    Ca    9.6      06 Oct 2022 08:27    TPro  6.4  /  Alb  2.7<L>  /  TBili  1.3<H>  /  DBili  0.9<H>  /  AST  67<H>  /  ALT  212<H>  /  AlkPhos  226<H>  10-06    LIVER FUNCTIONS - ( 06 Oct 2022 08:27 )  Alb: 2.7 g/dL / Pro: 6.4 g/dL / ALK PHOS: 226 U/L / ALT: 212 U/L DA / AST: 67 U/L / GGT: x             MEDICATIONS  (STANDING):  dextrose 5% + sodium chloride 0.45%. 1000 milliLiter(s) (120 mL/Hr) IV Continuous <Continuous>  heparin   Injectable 5000 Unit(s) SubCutaneous every 8 hours    MEDICATIONS  (PRN):  HYDROmorphone  Injectable 0.5 milliGRAM(s) IV Push every 4 hours PRN Moderate Pain (4 - 6)  ondansetron Injectable 4 milliGRAM(s) IV Push every 6 hours PRN Nausea      Assessment:   77y Female who is s/p. lap cholecystectomy with IOC POD #0 Stable    Plan:  - Pain control prn  -Dressing change prn   - Reg diet and DC IV fluids  - Incentive Spirometry  - OOB and ambulating as tolerated  - F/u AM labs  - DVT ppx

## 2022-10-07 NOTE — PROGRESS NOTE ADULT - NS ATTEND AMEND GEN_ALL_CORE FT
Pt seen and examined. Reports she is hungry. C/o gas and burping. Has hiatal hernia, GERD. LFTs downtrending, T bili 1.3. MRCP negative for CBD stone. Abd- mild RUQ tenderness, no guarding no rebound  For lap herminio today. Discussed surgery with pt using pt education pamphlet in [employer].
Pt seen and examined. Tolerating diet. Minimal incisional pain. LFTs trending down, T bili nml.   Abd- soft, non distended, no guarding no rebound, mild incisional tenderness.   Stable for discharge to home. Provided letter to pt as she has plane travel scheduled and will be unable to fly.  Went over discharge instructions with her and signs and symptoms to call for. F/u in office in 1 wk.
- Cholecystitis.  - Elevtaed LFTs.  - Abnormal CT abdo.    Patient doing overall well. LFTs continue to downtrend. Cholecystectomy per SUrgery. No plans for ERCP at this time. Recosut PRN.

## 2022-10-07 NOTE — DISCHARGE NOTE PROVIDER - ATTENDING DISCHARGE PHYSICAL EXAMINATION:
Pt seen and examined. Has mild incisional pain improved with pain medication.  Tolerating diet.  LFT's trending down.  Abd- soft, non distended, mild incisional tenderness, no guarding no rebound. Incisions C/D/I  Pt stable for discharge to home. Follow up in office in one week.

## 2022-10-07 NOTE — PROGRESS NOTE ADULT - SUBJECTIVE AND OBJECTIVE BOX
INTERVAL HPI/OVERNIGHT EVENTS:    Pt seen and examined at bedside. Offers no acute complaints at time, admits to incisional pain, denies nausea or vomiting. No fever, chills, SOB or CP.     Vital Signs Last 24 Hrs  T(C): 37 (07 Oct 2022 05:05), Max: 37.4 (06 Oct 2022 21:57)  T(F): 98.6 (07 Oct 2022 05:05), Max: 99.3 (06 Oct 2022 21:57)  HR: 63 (07 Oct 2022 05:05) (61 - 74)  BP: 130/79 (07 Oct 2022 05:05) (113/83 - 151/94)  BP(mean): 94 (06 Oct 2022 23:00) (81 - 103)  RR: 18 (07 Oct 2022 05:05) (18 - 22)  SpO2: 98% (07 Oct 2022 05:05) (95% - 98%)    Parameters below as of 07 Oct 2022 05:05  Patient On (Oxygen Delivery Method): room air      I&O's Detail    06 Oct 2022 07:01  -  07 Oct 2022 07:00  --------------------------------------------------------  IN:    dextrose 5% + sodium chloride 0.45%: 960 mL    Lactated Ringers Bolus: 1450 mL  Total IN: 2410 mL    OUT:    Indwelling Catheter - Urethral (mL): 200 mL  Total OUT: 200 mL    Total NET: 2210 mL      Physical Exam  General: AAOx3, No acute distress  Skin: No jaundice, no icterus  Abdomen: soft, nondistended, min incisional TTP, dressing c/d/i, no rebound tenderness, no guarding, no palpable masses  Extremities: non edematous, no calf pain bilaterally        Labs:                        12.9   6.46  )-----------( 200      ( 06 Oct 2022 08:27 )             40.5     10-06    141  |  108  |  3<L>  ----------------------------<  138<H>  3.8   |  26  |  0.66    Ca    9.6      06 Oct 2022 08:27    TPro  6.4  /  Alb  2.7<L>  /  TBili  1.3<H>  /  DBili  0.9<H>  /  AST  67<H>  /  ALT  212<H>  /  AlkPhos  226<H>  10-06    PT/INR - ( 06 Oct 2022 08:27 )   PT: 12.6 sec;   INR: 1.06 ratio         PTT - ( 06 Oct 2022 08:27 )  PTT:31.9 sec

## 2022-10-07 NOTE — DISCHARGE NOTE NURSING/CASE MANAGEMENT/SOCIAL WORK - NSDCPEFALRISK_GEN_ALL_CORE
For information on Fall & Injury Prevention, visit: [email or web address] OR  [email or web address] OR  https://www.cdc.gov/steadi/patient.html

## 2022-10-09 LAB
CULTURE RESULTS: SIGNIFICANT CHANGE UP
CULTURE RESULTS: SIGNIFICANT CHANGE UP
SPECIMEN SOURCE: SIGNIFICANT CHANGE UP
SPECIMEN SOURCE: SIGNIFICANT CHANGE UP

## 2022-10-12 PROBLEM — K81.0 ACUTE CHOLECYSTITIS: Status: ACTIVE | Noted: 2022-10-12

## 2022-10-12 LAB — SURGICAL PATHOLOGY STUDY: SIGNIFICANT CHANGE UP

## 2022-10-13 ENCOUNTER — APPOINTMENT (OUTPATIENT)
Dept: SURGERY | Facility: CLINIC | Age: 77
End: 2022-10-13

## 2022-10-13 VITALS
SYSTOLIC BLOOD PRESSURE: 126 MMHG | WEIGHT: 176 LBS | HEART RATE: 65 BPM | DIASTOLIC BLOOD PRESSURE: 77 MMHG | BODY MASS INDEX: 35.48 KG/M2 | HEIGHT: 59 IN

## 2022-10-13 VITALS — TEMPERATURE: 97.2 F

## 2022-10-13 DIAGNOSIS — K81.0 ACUTE CHOLECYSTITIS: ICD-10-CM

## 2022-10-13 PROCEDURE — 99024 POSTOP FOLLOW-UP VISIT: CPT

## 2022-10-13 NOTE — HISTORY OF PRESENT ILLNESS
[de-identified] : Ms. FAUSTNI is a 77 year y/o F who was admitted to [facility] with acute cholecystitis, S/P laparoscopic cholecystectomy, 10/06/22 with Dr. Hammonds. Patient is without reported complaints. She is upset that she is not able to see Dr. Hammonds or a female doctor this week or next week. \par No abdominal pain. Appetite is normal. BM's are normal. Patient states she has a lot of medical questions that she needs answered, unrelated to the surgery.

## 2022-10-13 NOTE — ASSESSMENT
[FreeTextEntry1] : Ms. FAUSTIN is a 77 year y/o F, S/P laparoscopic cholecystectomy, 10/06/22. Patient is without reported complaints. Denies abdominal pain. No nausea/vomiting. No fevers/chills. Patient is tolerating a regular diet with normal appetite. Normal BM's.\par \par All surgical incisions are healing well and as expected. There is no evidence of infection or complication, and patient is progressing as expected.\par \par

## 2022-10-13 NOTE — REASON FOR VISIT
[Post Op: _________] : a [unfilled] post op visit [FreeTextEntry1] : S/P laparoscopic cholecystectomy, 10/06/22

## 2022-10-13 NOTE — PHYSICAL EXAM
[No Rash or Lesion] : No rash or lesion [Alert] : alert [Oriented to Person] : oriented to person [Oriented to Place] : oriented to place [Oriented to Time] : oriented to time [Calm] : calm [de-identified] : A/Ox3; NAD. appears comfortable [de-identified] : EOMI; sclera anicteric. [de-identified] : Abdomen soft and non tender. Wounds healing well. Port sites with no erythema or drainage.

## 2022-10-20 PROCEDURE — 76705 ECHO EXAM OF ABDOMEN: CPT

## 2022-10-20 PROCEDURE — 88304 TISSUE EXAM BY PATHOLOGIST: CPT

## 2022-10-20 PROCEDURE — 85025 COMPLETE CBC W/AUTO DIFF WBC: CPT

## 2022-10-20 PROCEDURE — C1889: CPT

## 2022-10-20 PROCEDURE — 86850 RBC ANTIBODY SCREEN: CPT

## 2022-10-20 PROCEDURE — 93005 ELECTROCARDIOGRAM TRACING: CPT

## 2022-10-20 PROCEDURE — 87086 URINE CULTURE/COLONY COUNT: CPT

## 2022-10-20 PROCEDURE — 87040 BLOOD CULTURE FOR BACTERIA: CPT

## 2022-10-20 PROCEDURE — 99285 EMERGENCY DEPT VISIT HI MDM: CPT | Mod: 25

## 2022-10-20 PROCEDURE — 83605 ASSAY OF LACTIC ACID: CPT

## 2022-10-20 PROCEDURE — 96375 TX/PRO/DX INJ NEW DRUG ADDON: CPT

## 2022-10-20 PROCEDURE — 85730 THROMBOPLASTIN TIME PARTIAL: CPT

## 2022-10-20 PROCEDURE — 87635 SARS-COV-2 COVID-19 AMP PRB: CPT

## 2022-10-20 PROCEDURE — 80053 COMPREHEN METABOLIC PANEL: CPT

## 2022-10-20 PROCEDURE — 76000 FLUOROSCOPY <1 HR PHYS/QHP: CPT

## 2022-10-20 PROCEDURE — 84100 ASSAY OF PHOSPHORUS: CPT

## 2022-10-20 PROCEDURE — 74177 CT ABD & PELVIS W/CONTRAST: CPT | Mod: MA

## 2022-10-20 PROCEDURE — U0005: CPT

## 2022-10-20 PROCEDURE — 85027 COMPLETE CBC AUTOMATED: CPT

## 2022-10-20 PROCEDURE — U0003: CPT

## 2022-10-20 PROCEDURE — A9585: CPT

## 2022-10-20 PROCEDURE — 86803 HEPATITIS C AB TEST: CPT

## 2022-10-20 PROCEDURE — 80048 BASIC METABOLIC PNL TOTAL CA: CPT

## 2022-10-20 PROCEDURE — 36415 COLL VENOUS BLD VENIPUNCTURE: CPT

## 2022-10-20 PROCEDURE — C9399: CPT

## 2022-10-20 PROCEDURE — 83690 ASSAY OF LIPASE: CPT

## 2022-10-20 PROCEDURE — 96374 THER/PROPH/DIAG INJ IV PUSH: CPT

## 2022-10-20 PROCEDURE — 80076 HEPATIC FUNCTION PANEL: CPT

## 2022-10-20 PROCEDURE — 74183 MRI ABD W/O CNTR FLWD CNTR: CPT | Mod: MA

## 2022-10-20 PROCEDURE — 83735 ASSAY OF MAGNESIUM: CPT

## 2022-10-20 PROCEDURE — 84484 ASSAY OF TROPONIN QUANT: CPT

## 2022-10-20 PROCEDURE — 86900 BLOOD TYPING SEROLOGIC ABO: CPT

## 2022-10-20 PROCEDURE — 86901 BLOOD TYPING SEROLOGIC RH(D): CPT

## 2022-10-20 PROCEDURE — 85610 PROTHROMBIN TIME: CPT

## 2022-10-20 PROCEDURE — 81001 URINALYSIS AUTO W/SCOPE: CPT

## 2022-10-26 ENCOUNTER — APPOINTMENT (OUTPATIENT)
Dept: SURGERY | Facility: CLINIC | Age: 77
End: 2022-10-26

## 2022-10-26 VITALS
WEIGHT: 177 LBS | BODY MASS INDEX: 35.68 KG/M2 | SYSTOLIC BLOOD PRESSURE: 163 MMHG | DIASTOLIC BLOOD PRESSURE: 85 MMHG | HEART RATE: 75 BPM | HEIGHT: 59 IN

## 2022-10-26 VITALS — TEMPERATURE: 98.5 F

## 2022-10-26 VITALS — OXYGEN SATURATION: 96 %

## 2022-10-26 DIAGNOSIS — E27.8 OTHER SPECIFIED DISORDERS OF ADRENAL GLAND: ICD-10-CM

## 2022-10-26 DIAGNOSIS — Z90.49 ACQUIRED ABSENCE OF OTHER SPECIFIED PARTS OF DIGESTIVE TRACT: ICD-10-CM

## 2022-10-26 DIAGNOSIS — K44.9 DIAPHRAGMATIC HERNIA W/OUT OBSTRUCTION OR GANGRENE: ICD-10-CM

## 2022-10-26 PROCEDURE — 99024 POSTOP FOLLOW-UP VISIT: CPT

## 2022-10-26 RX ORDER — DEXAMETHASONE 1 MG/1
1 TABLET ORAL ONCE
Qty: 1 | Refills: 0 | Status: ACTIVE | COMMUNITY
Start: 2022-10-26 | End: 1900-01-01

## 2022-11-15 ENCOUNTER — OUTPATIENT (OUTPATIENT)
Dept: OUTPATIENT SERVICES | Facility: HOSPITAL | Age: 77
LOS: 1 days | End: 2022-11-15
Payer: MEDICARE

## 2022-11-15 ENCOUNTER — APPOINTMENT (OUTPATIENT)
Dept: RADIOLOGY | Facility: HOSPITAL | Age: 77
End: 2022-11-15

## 2022-11-15 DIAGNOSIS — Z96.60 PRESENCE OF UNSPECIFIED ORTHOPEDIC JOINT IMPLANT: Chronic | ICD-10-CM

## 2022-11-15 DIAGNOSIS — Z98.89 OTHER SPECIFIED POSTPROCEDURAL STATES: Chronic | ICD-10-CM

## 2022-11-15 DIAGNOSIS — K44.9 DIAPHRAGMATIC HERNIA WITHOUT OBSTRUCTION OR GANGRENE: ICD-10-CM

## 2022-11-15 DIAGNOSIS — L73.2 HIDRADENITIS SUPPURATIVA: Chronic | ICD-10-CM

## 2022-11-15 PROCEDURE — 74240 X-RAY XM UPR GI TRC 1CNTRST: CPT

## 2022-11-15 PROCEDURE — 74240 X-RAY XM UPR GI TRC 1CNTRST: CPT | Mod: 26

## 2022-11-16 NOTE — ASSESSMENT
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 Surgical incisions are healing well and as expected. There is no evidence of infection or complication, and patient is progressing as expected. Post-operative wound care, activity, restrictions and precautions reinforced.  Pathology results were discussed in detail. Patient was instructed no heavy lifting 2 to 4 weeks. Patient's questions and concerns addressed to patient's satisfaction. \par \par Patient has Bilateral adrenal adenomata seen in CT and MRCP. CT of the abdomen and pelvis on 10/04/2022 result were c/w Nonspecific 1.9 cm right adrenal gland nodule. Nonspecific 2.4 cm medial left adrenal gland nodule and nonspecific 2 cm lateral left adrenal gland nodule.\par \par

## 2022-11-16 NOTE — PLAN
[FreeTextEntry1] : - Upper GI to evaluate the Hiatal hernia\par - Labs take Dexamethasone  at 11 pm a day before the blood test\par \par Please follow up at the office  post labs and Upper GI and as needed for any questions or concerns

## 2022-11-16 NOTE — HISTORY OF PRESENT ILLNESS
[de-identified] : HEATH FAUSTIN is a 77 year old female who presents in the office for postop  visit. 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 Today patient is doing well, offers no complaints. Denies any fevers, chills, nausea, vomiting, diarrhea or constipation. Patient able to tolerate regular diet with normal bowel movements. Surgical incisions are healing well. No sign of inflammation or exudate. Patient has a history of hiatal hernia and stated that she in on Omeprazole daily for Reflux. \par \par Patient has Bilateral adrenal adenomata seen in CT and MRCP. CT of the abdomen and pelvis on 10/04/2022 result were c/w Nonspecific 1.9 cm right adrenal gland nodule. Nonspecific 2.4 cm medial left adrenal gland nodule and nonspecific 2 cm lateral left adrenal gland nodule.

## 2022-11-16 NOTE — REASON FOR VISIT
[Post Op: _________] : a [unfilled] post op visit [FreeTextEntry1] : s/p Laparoscopic cholecystectomy with cholangiogram on 10/06/2022

## 2022-11-16 NOTE — CONSULT LETTER
[Courtesy Letter:] : I had the pleasure of seeing your patient, [unfilled], in my office today. [Consult Closing:] : Thank you very much for allowing me to participate in the care of this patient.  If you have any questions, please do not hesitate to contact me. [Sincerely,] : Sincerely, [FreeTextEntry1] : She is healing up as expected after laparoscopic cholecystectomy. She has bilateral adrenal nodules found incidentally on imaging which I am evaluating further with functional hormonal workup.  [FreeTextEntry3] : Nelsy Hammonds MD FACS

## 2022-11-16 NOTE — DATA REVIEWED
[FreeTextEntry1] : ACC: [id number] EXAM: CT ABDOMEN AND PELVIS IC\par \par PROCEDURE DATE: 10/04/2022\par \par \par \par INTERPRETATION: CLINICAL INFORMATION: RUQ abd pain elevated liver enzymes\par \par COMPARISON: None.\par \par CONTRAST/COMPLICATIONS:\par IV Contrast: 90 cc administered 10 cc discarded\par Oral Contrast: None\par Complications: None\par \par \par PROCEDURE:\par Axial CT images were acquired through the abdomen and pelvis following intravenous contrast. Coronal and sagittal reformatted images were generated.\par \par FINDINGS:\par LOWER CHEST: Mild bibasilar atelectasis.\par LIVER: Within normal limits.\par BILE DUCTS: Normal caliber.\par GALLBLADDER: Gallbladder wall nodular thickening and enhancement with gallbladder wall edema/pericholecystic fluid. Enhancement of the cystic duct also seen. In addition, a few small bubbles of air density are seen within the gallbladder wall and/or lumen.\par SPLEEN: Within normal limits.\par PANCREAS: Within normal limits.\par ADRENALS: Nonspecific 1.9 cm right adrenal gland nodule. Nonspecific 2.4 cm medial left adrenal gland nodule and nonspecific 2 cm lateral left adrenal gland nodule.\par KIDNEYS/URETERS: Kidneys enhance symmetrically without hydronephrosis. Tiny low-attenuation renal structures are too small to characterize. Punctate nonobstructing upper pole right renal stone is seen. Ureters are not dilated.\par \par BLADDER: Limited evaluation of the pelvic contents including bladder due to metallic streak artifact from bilateral hip prostheses.\par REPRODUCTIVE ORGANS: Limited\par \par BOWEL: Evaluation of bowel is limited due to the lack of oral contrast, however there is no bowel obstruction. Colonic diverticulosis without acute diverticulitis. There is segmental thickening of the sigmoid colon in a region of multiple diverticula, question chronic diverticular disease, underlying neoplasm cannot be excluded. Recommend further evaluation with colonoscopy. Normal appendix without appendicitis. Small bowel loops are not dilated. Large hiatal hernia.\par PERITONEUM: No free air. Trace ascites adjacent to the hepatic tip versus artifact.\par VESSELS: No abdominal aortic aneurysm or dissection.\par RETROPERITONEUM: No lymphadenopathy.\par ABDOMINAL WALL: Moderate fat-containing periumbilical hernia. Calcified buttock granulomas are also seen.\par BONES: Bilateral hip prosthesis. Multilevel degenerative changes of the spine. Grade 1 anterolisthesis of L4 on L5.\par \par IMPRESSION:\par \par Abnormal appearance of the gallbladder which demonstrates thickened and enhancing wall with gallbladder wall edema and small amount of wall/intraluminal gas. Correlate with emphysematous cholecystitis. Recommend surgical consult.\par Dr. Brock notified Dr. Saucedo on 10/4/2022 at 4:42 AM Eastern standard time with read back.\par \par There is segmental thickening of the sigmoid colon in a region of multiple diverticula, question chronic diverticular disease, underlying neoplasm cannot be excluded. Recommend further evaluation with colonoscopy.\par \par Nonspecific bilateral adrenal gland nodules.\par \par --- End of Report ---\par __________________________________________________\par \par

## 2023-01-04 LAB
ALDOSTERONE SERUM: 11.3 NG/DL
CORTIS SERPL-MCNC: 1.4 UG/DL
DEXAMETHASONE LEVEL: 322 NG/DL
METANEPHRINE, PL: 26.7 PG/ML
NORMETANEPHRINE, PL: 99.8 PG/ML
RENIN ACTIVITY, PLASMA: 0.49 NG/ML/HR

## 2023-03-10 ENCOUNTER — APPOINTMENT (OUTPATIENT)
Dept: GASTROENTEROLOGY | Facility: CLINIC | Age: 78
End: 2023-03-10

## 2023-03-29 NOTE — ED ADULT NURSE NOTE - NS ED NURSE LEVEL OF CONSCIOUSNESS AFFECT
Appropriate Eucrisa Counseling: Patient may experience a mild burning sensation during topical application. Eucrisa is not approved in children less than 3 months of age.

## 2023-06-24 NOTE — H&P ADULT - ASSESSMENT
78 y/o f with no sign. PMHx  presents to the ED with Abd pain x 4 days. Pain is mostly postprandial in the epigastric area described as sharp and persistent , referred to the RUQ  with associated nausea, multiple NBNB vomiting. Afebrile , Jaundiced , mild leucocytosis, markedly elevated LFTs with elevated TBili , .CT shows possible Acute emphysematous cholecystitis . R/O Choledocholithiasis.     Admit to Dr Hammonds   NPO, IVF  IV ABx   Hepatic US and Urgent MRCP   GI consult in AM   Pain/Nausea control prn   Preop labs   COVID surveillance   DVT PPx   No

## 2024-09-04 ENCOUNTER — NON-APPOINTMENT (OUTPATIENT)
Age: 79
End: 2024-09-04

## 2024-09-05 ENCOUNTER — APPOINTMENT (OUTPATIENT)
Dept: SURGERY | Facility: CLINIC | Age: 79
End: 2024-09-05
Payer: MEDICARE

## 2024-09-05 VITALS
HEART RATE: 67 BPM | OXYGEN SATURATION: 98 % | RESPIRATION RATE: 16 BRPM | SYSTOLIC BLOOD PRESSURE: 122 MMHG | DIASTOLIC BLOOD PRESSURE: 78 MMHG | BODY MASS INDEX: 34.27 KG/M2 | HEIGHT: 59 IN | WEIGHT: 170 LBS

## 2024-09-05 DIAGNOSIS — Z78.9 OTHER SPECIFIED HEALTH STATUS: ICD-10-CM

## 2024-09-05 DIAGNOSIS — K21.9 GASTRO-ESOPHAGEAL REFLUX DISEASE W/OUT ESOPHAGITIS: ICD-10-CM

## 2024-09-05 DIAGNOSIS — K44.9 DIAPHRAGMATIC HERNIA W/OUT OBSTRUCTION OR GANGRENE: ICD-10-CM

## 2024-09-05 PROCEDURE — 99213 OFFICE O/P EST LOW 20 MIN: CPT

## 2024-09-10 ENCOUNTER — APPOINTMENT (OUTPATIENT)
Dept: SURGERY | Facility: CLINIC | Age: 79
End: 2024-09-10
Payer: MEDICARE

## 2024-09-10 VITALS
OXYGEN SATURATION: 96 % | HEIGHT: 59 IN | SYSTOLIC BLOOD PRESSURE: 122 MMHG | WEIGHT: 167 LBS | TEMPERATURE: 97.5 F | DIASTOLIC BLOOD PRESSURE: 80 MMHG | BODY MASS INDEX: 33.67 KG/M2 | HEART RATE: 76 BPM

## 2024-09-10 DIAGNOSIS — K44.9 DIAPHRAGMATIC HERNIA W/OUT OBSTRUCTION OR GANGRENE: ICD-10-CM

## 2024-09-10 PROCEDURE — 99024 POSTOP FOLLOW-UP VISIT: CPT

## 2024-09-16 ENCOUNTER — APPOINTMENT (OUTPATIENT)
Dept: GASTROENTEROLOGY | Facility: CLINIC | Age: 79
End: 2024-09-16
Payer: MEDICARE

## 2024-09-16 DIAGNOSIS — K21.9 GASTRO-ESOPHAGEAL REFLUX DISEASE W/OUT ESOPHAGITIS: ICD-10-CM

## 2024-09-16 DIAGNOSIS — K44.9 DIAPHRAGMATIC HERNIA W/OUT OBSTRUCTION OR GANGRENE: ICD-10-CM

## 2024-09-16 PROCEDURE — 99442: CPT

## 2024-09-25 ENCOUNTER — APPOINTMENT (OUTPATIENT)
Dept: GASTROENTEROLOGY | Facility: HOSPITAL | Age: 79
End: 2024-09-25

## 2024-09-25 ENCOUNTER — OUTPATIENT (OUTPATIENT)
Dept: OUTPATIENT SERVICES | Facility: HOSPITAL | Age: 79
LOS: 1 days | End: 2024-09-25
Payer: MEDICARE

## 2024-09-25 ENCOUNTER — TRANSCRIPTION ENCOUNTER (OUTPATIENT)
Age: 79
End: 2024-09-25

## 2024-09-25 VITALS
OXYGEN SATURATION: 92 % | HEIGHT: 62 IN | WEIGHT: 175.05 LBS | SYSTOLIC BLOOD PRESSURE: 122 MMHG | HEART RATE: 69 BPM | DIASTOLIC BLOOD PRESSURE: 75 MMHG | TEMPERATURE: 98 F | RESPIRATION RATE: 15 BRPM

## 2024-09-25 DIAGNOSIS — Z98.89 OTHER SPECIFIED POSTPROCEDURAL STATES: Chronic | ICD-10-CM

## 2024-09-25 DIAGNOSIS — K44.9 DIAPHRAGMATIC HERNIA WITHOUT OBSTRUCTION OR GANGRENE: ICD-10-CM

## 2024-09-25 DIAGNOSIS — Z96.60 PRESENCE OF UNSPECIFIED ORTHOPEDIC JOINT IMPLANT: Chronic | ICD-10-CM

## 2024-09-25 DIAGNOSIS — K21.9 GASTRO-ESOPHAGEAL REFLUX DISEASE WITHOUT ESOPHAGITIS: ICD-10-CM

## 2024-09-25 DIAGNOSIS — L73.2 HIDRADENITIS SUPPURATIVA: Chronic | ICD-10-CM

## 2024-09-25 PROCEDURE — 91010 ESOPHAGUS MOTILITY STUDY: CPT | Mod: 26

## 2024-09-25 PROCEDURE — 91037 ESOPH IMPED FUNCTION TEST: CPT | Mod: 26

## 2024-09-25 PROCEDURE — 91037 ESOPH IMPED FUNCTION TEST: CPT

## 2024-09-25 PROCEDURE — 91013 ESOPHGL MOTIL W/STIM/PERFUS: CPT | Mod: 26

## 2024-09-25 PROCEDURE — 91010 ESOPHAGUS MOTILITY STUDY: CPT

## 2024-09-25 NOTE — PRE PROCEDURE NOTE - PRE PROCEDURE EVALUATION
Attending Physician:              Hung Shrestha MD MSEd    Indication for Procedure:        Hiatal Hernia                PAST MEDICAL & SURGICAL HISTORY:  GERD (gastroesophageal reflux disease)      Arthritis      S/P breast biopsy, right      S/P carpal tunnel release  right      S/P hip replacement  right      S/P arthroscopy of left knee      S/P spinal surgery  lumbar      Hidradenitis suppurativa  right axilla and groin            See [employer] Note for further details  ALLERGIES:  Aleve (Rash)  adhesives (Rash)  Celebrex (Short breath)    HOME MEDICATIONS:  aspirin 81 mg oral tablet: 1 tab(s) orally once a day  omeprazole 40 mg oral delayed release capsule: 1 cap(s) orally once a day      See [employer] Note for further details    AICD/PPM: [ ] yes   [ x] no    PERTINENT LAB DATA:                      PHYSICAL EXAMINATION:    T(C): --  HR: --  BP: --  RR: --  SpO2: --    Constitutional: NAD  HEENT: PERRLA, EOMI,    Neck:  No JVD  Respiratory: CTAB/L  Cardiovascular: S1 and S2  Gastrointestinal: BS+, soft, NT/ND  Extremities: No peripheral edema  Neurological: A/O x 3, no focal deficits  Psychiatric: Normal mood, normal affect  Skin: No rashes    ASA Class: I [ ]  II [x ]  III [ ]  IV [ ]    COMMENTS:    The patient is a suitable candidate for the planned procedure unless box checked [ ]  No, explain:

## 2025-01-09 NOTE — CONSULT NOTE ADULT - NS ATTEND AMEND GEN_ALL_CORE FT
scds
- Abdominal pain.  - Elevated LFTs.  - Cholecystitis.    Patient seen and examined. Reports feeling better. Abd soft, minimally tender. LFTs downtrending. MRCP neg for biliary dilation or choledocholithiasis. LFTs possibly 2/2 passed stone or from perihepatic inflammation from cholecystitis. Trend LFTs. Cholecystectomy as planned. No plans for EUS or ERCP unless LFTs persistently elevated or clinical status worsens.

## (undated) DEVICE — SUT SOFSILK 0 30" TIES

## (undated) DEVICE — D HELP - CLEARVIEW CLEARIFY SYSTEM

## (undated) DEVICE — TUBING SUCTION CONN 6FT STERILE

## (undated) DEVICE — SOL IRR POUR NS 0.9% 1500ML

## (undated) DEVICE — BLADE SURGICAL #15 CARBON

## (undated) DEVICE — PACK GENERAL LAPAROSCOPY

## (undated) DEVICE — CATH IV SAFE INSYTE 14G X 1.75" (ORANGE)

## (undated) DEVICE — SUT MAXON 1 60" T-60

## (undated) DEVICE — SUT SOFSILK 2-0 30" TIES

## (undated) DEVICE — GLV 6.5 PROTEXIS (WHITE)

## (undated) DEVICE — ENDOCATCH 10MM SPECIMEN POUCH

## (undated) DEVICE — DRSG STERISTRIPS 0.5 X 4"

## (undated) DEVICE — DRSG TEGADERM 2.5X3"

## (undated) DEVICE — SUCTION YANKAUER NO CONTROL VENT

## (undated) DEVICE — SUT POLYSORB 4-0 27" P-12 UNDYED

## (undated) DEVICE — VENODYNE/SCD SLEEVE CALF MEDIUM

## (undated) DEVICE — SUT POLYSORB 0 30" GU-46

## (undated) DEVICE — SUT BIOSYN 3-0 18" P-12

## (undated) DEVICE — DRAPE LIGHT HANDLE COVER (BLUE)

## (undated) DEVICE — FOLEY HOLDER STATLOCK 2 WAY ADULT

## (undated) DEVICE — DRAPE C ARM UNIVERSAL

## (undated) DEVICE — SYR LUER LOK 30CC

## (undated) DEVICE — DRSG CURITY GAUZE SPONGE 4 X 4" 12-PLY

## (undated) DEVICE — SYR LUER LOK 20CC

## (undated) DEVICE — DRSG BANDAID 0.75X3"

## (undated) DEVICE — DRSG MASTISOL

## (undated) DEVICE — ELCTR FOOT CONTROL L WIRE LAPAROSCOPIC

## (undated) DEVICE — WARMING BLANKET UPPER ADULT

## (undated) DEVICE — DRSG MEDIPORE DRESS IT 5-7/8 X 11"

## (undated) DEVICE — BASIN SET SINGLE

## (undated) DEVICE — ELCTR BOVIE BLADE 3/4" EXTENDED LENGTH 6"

## (undated) DEVICE — NDL HYPO REGULAR BEVEL 25G X 1.5" (BLUE)

## (undated) DEVICE — TROCAR COVIDIEN VERSAPORT BLADELESS OPTICAL 5MM STANDARD

## (undated) DEVICE — TROCAR ETHICON ENDOPATH XCEL HASSON BLUNT TIP 12MM X 100MM STABILITY

## (undated) DEVICE — TUBING STRYKEFLOW II SUCTION / IRRIGATOR

## (undated) DEVICE — SOL INJ NS 0.9% 1000ML

## (undated) DEVICE — TUBING STRYKER PNEUMOSURE HI FLOW INSUFFLATOR

## (undated) DEVICE — PRECISION CUT SCISSOR MICROLINE MINI ENDOCUT DISP

## (undated) DEVICE — SPONGE DISSECTOR PEANUT

## (undated) DEVICE — ELCTR GROUNDING PAD ADULT COVIDIEN

## (undated) DEVICE — SYR LUER LOK 50CC

## (undated) DEVICE — FOR-ESU VALLEYLAB T7E15008DX: Type: DURABLE MEDICAL EQUIPMENT

## (undated) DEVICE — DRAPE C ARM SNAP 40X40"

## (undated) DEVICE — SYR LUER LOK 5CC